# Patient Record
Sex: FEMALE | Race: WHITE | NOT HISPANIC OR LATINO | Employment: FULL TIME | URBAN - METROPOLITAN AREA
[De-identification: names, ages, dates, MRNs, and addresses within clinical notes are randomized per-mention and may not be internally consistent; named-entity substitution may affect disease eponyms.]

---

## 2023-01-09 ENCOUNTER — LAB (OUTPATIENT)
Dept: LAB | Facility: HOSPITAL | Age: 28
End: 2023-01-09
Payer: COMMERCIAL

## 2023-01-09 ENCOUNTER — TRANSCRIBE ORDERS (OUTPATIENT)
Dept: LAB | Facility: HOSPITAL | Age: 28
End: 2023-01-09
Payer: COMMERCIAL

## 2023-01-09 DIAGNOSIS — Z01.419 ROUTINE GYNECOLOGICAL EXAMINATION: ICD-10-CM

## 2023-01-09 DIAGNOSIS — Z01.419 ROUTINE GYNECOLOGICAL EXAMINATION: Primary | ICD-10-CM

## 2023-01-09 LAB
ESTRADIOL SERPL HS-MCNC: 322 PG/ML
FSH SERPL-ACNC: 2.56 MIU/ML
HBA1C MFR BLD: 5.1 % (ref 4.8–5.6)
LH SERPL-ACNC: 10 MIU/ML
PROGEST SERPL-MCNC: 12.5 NG/ML
TESTOST SERPL-MCNC: 56.8 NG/DL (ref 8.4–48.1)

## 2023-01-09 PROCEDURE — 82670 ASSAY OF TOTAL ESTRADIOL: CPT

## 2023-01-09 PROCEDURE — 83036 HEMOGLOBIN GLYCOSYLATED A1C: CPT

## 2023-01-09 PROCEDURE — 84144 ASSAY OF PROGESTERONE: CPT

## 2023-01-09 PROCEDURE — 83002 ASSAY OF GONADOTROPIN (LH): CPT

## 2023-01-09 PROCEDURE — 82627 DEHYDROEPIANDROSTERONE: CPT

## 2023-01-09 PROCEDURE — 36415 COLL VENOUS BLD VENIPUNCTURE: CPT

## 2023-01-09 PROCEDURE — 84403 ASSAY OF TOTAL TESTOSTERONE: CPT

## 2023-01-09 PROCEDURE — 83001 ASSAY OF GONADOTROPIN (FSH): CPT

## 2023-01-10 LAB — DHEA-S SERPL-MCNC: 291 UG/DL (ref 84.8–378)

## 2024-01-12 ENCOUNTER — TRANSCRIBE ORDERS (OUTPATIENT)
Dept: LAB | Facility: HOSPITAL | Age: 29
End: 2024-01-12
Payer: COMMERCIAL

## 2024-01-12 ENCOUNTER — LAB (OUTPATIENT)
Dept: LAB | Facility: HOSPITAL | Age: 29
End: 2024-01-12
Payer: COMMERCIAL

## 2024-01-12 DIAGNOSIS — Z01.419 ROUTINE GYNECOLOGICAL EXAMINATION: Primary | ICD-10-CM

## 2024-01-12 DIAGNOSIS — Z01.419 ROUTINE GYNECOLOGICAL EXAMINATION: ICD-10-CM

## 2024-01-12 LAB
ALBUMIN SERPL-MCNC: 4.4 G/DL (ref 3.5–5.2)
ALBUMIN/GLOB SERPL: 1.5 G/DL
ALP SERPL-CCNC: 54 U/L (ref 39–117)
ALT SERPL W P-5'-P-CCNC: 12 U/L (ref 1–33)
ANION GAP SERPL CALCULATED.3IONS-SCNC: 9.2 MMOL/L (ref 5–15)
AST SERPL-CCNC: 18 U/L (ref 1–32)
BILIRUB SERPL-MCNC: 0.7 MG/DL (ref 0–1.2)
BUN SERPL-MCNC: 14 MG/DL (ref 6–20)
BUN/CREAT SERPL: 17.1 (ref 7–25)
CALCIUM SPEC-SCNC: 9.1 MG/DL (ref 8.6–10.5)
CHLORIDE SERPL-SCNC: 105 MMOL/L (ref 98–107)
CO2 SERPL-SCNC: 24.8 MMOL/L (ref 22–29)
CREAT SERPL-MCNC: 0.82 MG/DL (ref 0.57–1)
DEPRECATED RDW RBC AUTO: 47.4 FL (ref 37–54)
EGFRCR SERPLBLD CKD-EPI 2021: 98.8 ML/MIN/1.73
ERYTHROCYTE [DISTWIDTH] IN BLOOD BY AUTOMATED COUNT: 14.1 % (ref 12.3–15.4)
ESTRADIOL SERPL HS-MCNC: 32.8 PG/ML
FSH SERPL-ACNC: 7.59 MIU/ML
GLOBULIN UR ELPH-MCNC: 2.9 GM/DL
GLUCOSE SERPL-MCNC: 82 MG/DL (ref 65–99)
HBA1C MFR BLD: 5.3 % (ref 4.8–5.6)
HCT VFR BLD AUTO: 36.3 % (ref 34–46.6)
HGB BLD-MCNC: 12.2 G/DL (ref 12–15.9)
LH SERPL-ACNC: 22.4 MIU/ML
MCH RBC QN AUTO: 31.1 PG (ref 26.6–33)
MCHC RBC AUTO-ENTMCNC: 33.6 G/DL (ref 31.5–35.7)
MCV RBC AUTO: 92.6 FL (ref 79–97)
PLATELET # BLD AUTO: 113 10*3/MM3 (ref 140–450)
POTASSIUM SERPL-SCNC: 4.1 MMOL/L (ref 3.5–5.2)
PROGEST SERPL-MCNC: 0.36 NG/ML
PROT SERPL-MCNC: 7.3 G/DL (ref 6–8.5)
RBC # BLD AUTO: 3.92 10*6/MM3 (ref 3.77–5.28)
SODIUM SERPL-SCNC: 139 MMOL/L (ref 136–145)
TSH SERPL DL<=0.05 MIU/L-ACNC: 0.98 UIU/ML (ref 0.27–4.2)
WBC NRBC COR # BLD AUTO: 2.67 10*3/MM3 (ref 3.4–10.8)

## 2024-01-12 PROCEDURE — 80053 COMPREHEN METABOLIC PANEL: CPT | Performed by: OBSTETRICS & GYNECOLOGY

## 2024-01-12 PROCEDURE — 83036 HEMOGLOBIN GLYCOSYLATED A1C: CPT | Performed by: OBSTETRICS & GYNECOLOGY

## 2024-01-12 PROCEDURE — 85027 COMPLETE CBC AUTOMATED: CPT | Performed by: OBSTETRICS & GYNECOLOGY

## 2024-01-12 PROCEDURE — 84144 ASSAY OF PROGESTERONE: CPT | Performed by: OBSTETRICS & GYNECOLOGY

## 2024-01-12 PROCEDURE — 36415 COLL VENOUS BLD VENIPUNCTURE: CPT

## 2024-01-12 PROCEDURE — 84402 ASSAY OF FREE TESTOSTERONE: CPT

## 2024-01-12 PROCEDURE — 84443 ASSAY THYROID STIM HORMONE: CPT

## 2024-01-12 PROCEDURE — 83001 ASSAY OF GONADOTROPIN (FSH): CPT

## 2024-01-12 PROCEDURE — 83002 ASSAY OF GONADOTROPIN (LH): CPT

## 2024-01-12 PROCEDURE — 82670 ASSAY OF TOTAL ESTRADIOL: CPT | Performed by: OBSTETRICS & GYNECOLOGY

## 2024-01-12 PROCEDURE — 82627 DEHYDROEPIANDROSTERONE: CPT

## 2024-01-12 PROCEDURE — 84403 ASSAY OF TOTAL TESTOSTERONE: CPT

## 2024-01-13 LAB — DHEA-S SERPL-MCNC: 372 UG/DL (ref 84.8–378)

## 2024-01-22 LAB
TESTOST FREE SERPL-MCNC: 1.1 PG/ML (ref 0–4.2)
TESTOST SERPL-MCNC: 41 NG/DL (ref 13–71)

## 2024-02-15 ENCOUNTER — OFFICE VISIT (OUTPATIENT)
Dept: INTERNAL MEDICINE | Facility: CLINIC | Age: 29
End: 2024-02-15
Payer: COMMERCIAL

## 2024-02-15 VITALS
OXYGEN SATURATION: 100 % | HEART RATE: 70 BPM | TEMPERATURE: 97.7 F | BODY MASS INDEX: 22.34 KG/M2 | RESPIRATION RATE: 16 BRPM | HEIGHT: 68 IN | WEIGHT: 147.4 LBS | SYSTOLIC BLOOD PRESSURE: 122 MMHG | DIASTOLIC BLOOD PRESSURE: 70 MMHG

## 2024-02-15 DIAGNOSIS — Z13.220 LIPID SCREENING: ICD-10-CM

## 2024-02-15 DIAGNOSIS — D72.819 LEUKOPENIA, UNSPECIFIED TYPE: ICD-10-CM

## 2024-02-15 DIAGNOSIS — Z13.0 SCREENING FOR BLOOD DISEASE: ICD-10-CM

## 2024-02-15 DIAGNOSIS — R53.82 CHRONIC FATIGUE: Primary | ICD-10-CM

## 2024-02-15 DIAGNOSIS — Z13.29 THYROID DISORDER SCREENING: ICD-10-CM

## 2024-02-15 DIAGNOSIS — R52 BODY ACHES: ICD-10-CM

## 2024-02-15 DIAGNOSIS — E28.2 PCOS (POLYCYSTIC OVARIAN SYNDROME): ICD-10-CM

## 2024-02-15 DIAGNOSIS — R11.0 NAUSEA: ICD-10-CM

## 2024-02-15 DIAGNOSIS — E55.9 VITAMIN D DEFICIENCY: ICD-10-CM

## 2024-02-15 DIAGNOSIS — Z13.1 SCREENING FOR DIABETES MELLITUS: ICD-10-CM

## 2024-02-15 DIAGNOSIS — D69.6 THROMBOCYTOPENIA: ICD-10-CM

## 2024-02-15 DIAGNOSIS — Z13.21 ENCOUNTER FOR VITAMIN DEFICIENCY SCREENING: ICD-10-CM

## 2024-02-15 RX ORDER — ERGOCALCIFEROL (VITAMIN D2) 10 MCG
400 TABLET ORAL DAILY
COMMUNITY

## 2024-02-15 RX ORDER — SPIRONOLACTONE 100 MG/1
TABLET, FILM COATED ORAL
COMMUNITY
Start: 2024-01-12

## 2024-02-15 NOTE — PROGRESS NOTES
"Subjective   Chief Complaint   Patient presents with    Fatigue    Establish Care     Had blood work that showed low WBC, been extremely fatigued and hungry the past few months       Donald Bennett is a 29 y.o. female.     The patient is here today to establish care. The patient recently had some blood work done that showed low white blood cell count and low platelets. She reports she is fatigued and hungry often. Low platelets is a chronic issue.     The patient's gynecologist, Dr. Muniz performed her annual exam and ordered her recent labs. Her previous PCP was at [facility] in [facility], but it has been 2 to 3 years since she was seen there. She has a known history of anemia, related to menorrhagia was seeing a hematologist. In 2018 she underwent a blood transfusion. Reports she has always been anemic even as a child. Currently she is not anemic, but her white blood cell counts and platelets were low. Going back to 2020 her white blood cell count had never been low, although her platelets have been low for a while. The patient admits to easy bruising and has several bruises. The hematologist was aware of her low platelets, but at the time they were not concerned about it. Now she has chronic fatigue, vertigo, and nausea which makes it difficult to function on a dally basis.     She was diagnosed with PCOS and has always experienced abnormal menstrual cycles. Currently she describes her menstrual cycles as \"medium\" flow, not as heavy. In 10/2023, she went 93 days without a menstrual cycle, and her cycles are becoming shorter and shorter in length. She walks for exercise and is attempting to lose weight to help keep her PCOS in control. She denies ovarian cysts. The patient is not satisfied with her PCOS treatment. Currently taking spironolactone. Has tried metformin in the past.     Related to her fatigue, her body feels heavy, tired, empty, with lethargy. She eats constantly and is doing protein shakes, " "is well hydrated, and is sleeping better. Previously her work schedule caused her sleep to be off, but this has improved. She admits to bruxism and joint pain, especially in her upper body.     She denies ever being infected with COVID-19 or influenza, but when she has viral rhinitis the rhinorrhea and sore throat tend to linger. She does not get sick often.     The patient has a history of seborrheic dermatitis that flares after consumption of large amounts of sugar or carbohydrates. This started in 2022. She still has some on her hands, but previously it was all over.     She admits to GI upset and constipation stating she is lactose intolerant, although her symptoms flare even without ingesting lactose.     I have reviewed the following portions of the patient's history and confirmed they are accurate: allergies, current medications, past family history, past medical history, past social history, past surgical history, and problem list    Family history is positive for hypertension in her mother.         Review of Systems  Pertinent items are noted in HPI.     Current Outpatient Medications on File Prior to Visit   Medication Sig    spironolactone (ALDACTONE) 100 MG tablet     Vitamin D, Cholecalciferol, (CHOLECALCIFEROL) 10 MCG (400 UNIT) tablet Take 1 tablet by mouth Daily.     No current facility-administered medications on file prior to visit.       Objective   Vitals:    02/15/24 0836   BP: 122/70   BP Location: Right arm   Patient Position: Sitting   Cuff Size: Adult   Pulse: 70   Resp: 16   Temp: 97.7 °F (36.5 °C)   TempSrc: Temporal   SpO2: 100%   Weight: 66.9 kg (147 lb 6.4 oz)   Height: 172 cm (67.72\")   PainSc: 0-No pain     Body mass index is 22.6 kg/m².    Physical Exam  Vitals reviewed.   Constitutional:       Appearance: Normal appearance. She is well-developed.   HENT:      Head: Normocephalic and atraumatic.      Nose: Nose normal.   Eyes:      General: Lids are normal.      Conjunctiva/sclera: " Conjunctivae normal.      Pupils: Pupils are equal, round, and reactive to light.   Neck:      Thyroid: No thyromegaly.      Trachea: Trachea normal.   Cardiovascular:      Rate and Rhythm: Normal rate and regular rhythm.      Heart sounds: Normal heart sounds.   Pulmonary:      Effort: Pulmonary effort is normal. No respiratory distress.      Breath sounds: Normal breath sounds.   Skin:     General: Skin is warm and dry.   Neurological:      Mental Status: She is alert and oriented to person, place, and time.      GCS: GCS eye subscore is 4. GCS verbal subscore is 5. GCS motor subscore is 6.   Psychiatric:         Attention and Perception: Attention normal.         Mood and Affect: Mood normal.         Speech: Speech normal.         Behavior: Behavior normal. Behavior is cooperative.         Thought Content: Thought content normal.     White blood cells and platelets are low.     Assessment & Plan   Problem List Items Addressed This Visit    None  Visit Diagnoses       Chronic fatigue    -  Primary    Relevant Orders    C-reactive Protein    Sedimentation Rate    Rheumatoid Factor    LESTER With / DsDNA, RNP, Sjogrens A / B, Smith    Uric Acid    HLA-B27 Antigen    T4, Free    T3, Free    TSH    Thyroid Antibodies    Leukopenia, unspecified type        Relevant Orders    CBC Auto Differential    Peripheral Blood Smear    Thrombocytopenia        Relevant Orders    US Abdomen Limited    Peripheral Blood Smear    PCOS (polycystic ovarian syndrome)        Relevant Orders    Insulin, Total    Body aches        Relevant Orders    C-reactive Protein    Sedimentation Rate    Rheumatoid Factor    LESTER With / DsDNA, RNP, Sjogrens A / B, Smith    Uric Acid    HLA-B27 Antigen    Vitamin D deficiency        Relevant Orders    Vitamin D,25-Hydroxy    Screening for blood disease        Relevant Orders    Comprehensive Metabolic Panel    Lipid Panel    Hemoglobin A1c    Vitamin B12 & Folate    Vitamin D,25-Hydroxy    Hepatitis C  Antibody    TSH    CBC Auto Differential    Thyroid disorder screening        Relevant Orders    T4, Free    T3, Free    TSH    Thyroid Antibodies    Lipid screening        Relevant Orders    Lipid Panel    Encounter for vitamin deficiency screening        Relevant Orders    Vitamin B12 & Folate    Vitamin D,25-Hydroxy    Screening for diabetes mellitus        Relevant Orders    Hemoglobin A1c    Nausea        Relevant Orders    Celiac Disease Panel          1. Chronic fatigue  - Chronic, unstable.   - Completing labs including CBC with differential, CMP, yearly screening labs, autoimmune panel, and thyroid panel.   - Patient will eat well-balanced meals, drink adequate amounts of fluids, get adequate amount of sleep.   - Follow up in 1 month.    2. Leukopenia   - New, unstable.   - Checking CBC with differential, CMP, and autoimmune panel.   - Consider referral to hematology.    3. Thrombocytopenia  - Chronic, unstable.   - Completing CBC with differential, CMP, autoimmune panel.  - Ordering abdominal ultrasound to further evaluate liver and spleen.    4. PCOS  - Chronic, unstable.   - Continue spironolactone.   - Completing yearly screening labs including A1c, fasting lipid panel, and insulin level.   - Continue follow up with gynecology.          Current Outpatient Medications:     spironolactone (ALDACTONE) 100 MG tablet, , Disp: , Rfl:     Vitamin D, Cholecalciferol, (CHOLECALCIFEROL) 10 MCG (400 UNIT) tablet, Take 1 tablet by mouth Daily., Disp: , Rfl:        Plan of care reviewed with the patient at the conclusion of today's visit.  Education was provided regarding diagnosis, management, and any prescribed or recommended OTC medications.  Patient verbalized understanding of and agreement with management plan.     Return in about 1 month (around 3/15/2024), or if symptoms worsen or fail to improve, for Follow-up.      Transcribed from ambient dictation for JEANNE Robles by Megan Ponce  JEANNE Burger.  02/15/24   08:58 EST  Scribed for JEANNE Robles by Alice Davalos 2/16/2024  08:08 EST    Patient or patient representative verbalized consent to the visit recording.  I have personally performed the services described in this document as transcribed by the above individual, and it is both accurate and complete.

## 2024-02-16 ENCOUNTER — LAB (OUTPATIENT)
Dept: INTERNAL MEDICINE | Facility: CLINIC | Age: 29
End: 2024-02-16
Payer: COMMERCIAL

## 2024-02-16 DIAGNOSIS — D69.6 THROMBOCYTOPENIA: ICD-10-CM

## 2024-02-16 DIAGNOSIS — E28.2 PCOS (POLYCYSTIC OVARIAN SYNDROME): ICD-10-CM

## 2024-02-16 DIAGNOSIS — E55.9 VITAMIN D DEFICIENCY: ICD-10-CM

## 2024-02-16 DIAGNOSIS — Z13.29 THYROID DISORDER SCREENING: ICD-10-CM

## 2024-02-16 DIAGNOSIS — Z13.1 SCREENING FOR DIABETES MELLITUS: ICD-10-CM

## 2024-02-16 DIAGNOSIS — D72.819 LEUKOPENIA, UNSPECIFIED TYPE: ICD-10-CM

## 2024-02-16 DIAGNOSIS — R11.0 NAUSEA: ICD-10-CM

## 2024-02-16 DIAGNOSIS — Z13.220 LIPID SCREENING: ICD-10-CM

## 2024-02-16 DIAGNOSIS — R53.82 CHRONIC FATIGUE: ICD-10-CM

## 2024-02-16 DIAGNOSIS — Z13.0 SCREENING FOR BLOOD DISEASE: ICD-10-CM

## 2024-02-16 DIAGNOSIS — R52 BODY ACHES: ICD-10-CM

## 2024-02-16 DIAGNOSIS — Z13.21 ENCOUNTER FOR VITAMIN DEFICIENCY SCREENING: ICD-10-CM

## 2024-02-16 LAB
25(OH)D3 SERPL-MCNC: 30.1 NG/ML (ref 30–100)
ALBUMIN SERPL-MCNC: 4.3 G/DL (ref 3.5–5.2)
ALBUMIN/GLOB SERPL: 1.6 G/DL
ALP SERPL-CCNC: 44 U/L (ref 39–117)
ALT SERPL W P-5'-P-CCNC: 15 U/L (ref 1–33)
ANION GAP SERPL CALCULATED.3IONS-SCNC: 8 MMOL/L (ref 5–15)
AST SERPL-CCNC: 18 U/L (ref 1–32)
BILIRUB SERPL-MCNC: 0.8 MG/DL (ref 0–1.2)
BUN SERPL-MCNC: 10 MG/DL (ref 6–20)
BUN/CREAT SERPL: 11.5 (ref 7–25)
CALCIUM SPEC-SCNC: 9.2 MG/DL (ref 8.6–10.5)
CHLORIDE SERPL-SCNC: 107 MMOL/L (ref 98–107)
CHOLEST SERPL-MCNC: 167 MG/DL (ref 0–200)
CHROMATIN AB SERPL-ACNC: 10 IU/ML (ref 0–14)
CO2 SERPL-SCNC: 24 MMOL/L (ref 22–29)
CREAT SERPL-MCNC: 0.87 MG/DL (ref 0.57–1)
CRP SERPL-MCNC: <0.3 MG/DL (ref 0–0.5)
DEPRECATED RDW RBC AUTO: 50.8 FL (ref 37–54)
EGFRCR SERPLBLD CKD-EPI 2021: 92.1 ML/MIN/1.73
EOSINOPHIL # BLD MANUAL: 0.09 10*3/MM3 (ref 0–0.4)
EOSINOPHIL NFR BLD MANUAL: 4 % (ref 0.3–6.2)
ERYTHROCYTE [DISTWIDTH] IN BLOOD BY AUTOMATED COUNT: 14.5 % (ref 12.3–15.4)
ERYTHROCYTE [SEDIMENTATION RATE] IN BLOOD: 9 MM/HR (ref 0–20)
FOLATE SERPL-MCNC: 11.9 NG/ML (ref 4.78–24.2)
GLOBULIN UR ELPH-MCNC: 2.7 GM/DL
GLUCOSE SERPL-MCNC: 84 MG/DL (ref 65–99)
HBA1C MFR BLD: 5.4 % (ref 4.8–5.6)
HCT VFR BLD AUTO: 35.7 % (ref 34–46.6)
HCV AB SER DONR QL: NORMAL
HDLC SERPL-MCNC: 67 MG/DL (ref 40–60)
HGB BLD-MCNC: 11.8 G/DL (ref 12–15.9)
LAB AP CASE REPORT: NORMAL
LDLC SERPL CALC-MCNC: 92 MG/DL (ref 0–100)
LDLC/HDLC SERPL: 1.39 {RATIO}
LYMPHOCYTES # BLD MANUAL: 0.65 10*3/MM3 (ref 0.7–3.1)
LYMPHOCYTES NFR BLD MANUAL: 10.1 % (ref 5–12)
MCH RBC QN AUTO: 31.2 PG (ref 26.6–33)
MCHC RBC AUTO-ENTMCNC: 33.1 G/DL (ref 31.5–35.7)
MCV RBC AUTO: 94.4 FL (ref 79–97)
MONOCYTES # BLD: 0.22 10*3/MM3 (ref 0.1–0.9)
NEUTROPHILS # BLD AUTO: 1.2 10*3/MM3 (ref 1.7–7)
NEUTROPHILS NFR BLD MANUAL: 55.6 % (ref 42.7–76)
PATH REPORT.FINAL DX SPEC: NORMAL
PATHOLOGY REVIEW: YES
PLAT MORPH BLD: NORMAL
PLATELET # BLD AUTO: 103 10*3/MM3 (ref 140–450)
POTASSIUM SERPL-SCNC: 3.7 MMOL/L (ref 3.5–5.2)
PROT SERPL-MCNC: 7 G/DL (ref 6–8.5)
RBC # BLD AUTO: 3.78 10*6/MM3 (ref 3.77–5.28)
RBC MORPH BLD: NORMAL
SODIUM SERPL-SCNC: 139 MMOL/L (ref 136–145)
T3FREE SERPL-MCNC: 2.45 PG/ML (ref 2–4.4)
T4 FREE SERPL-MCNC: 1.13 NG/DL (ref 0.93–1.7)
TRIGL SERPL-MCNC: 35 MG/DL (ref 0–150)
TSH SERPL DL<=0.05 MIU/L-ACNC: 1.41 UIU/ML (ref 0.27–4.2)
URATE SERPL-MCNC: 3.4 MG/DL (ref 2.4–5.7)
VARIANT LYMPHS NFR BLD MANUAL: 30.3 % (ref 19.6–45.3)
VIT B12 BLD-MCNC: 493 PG/ML (ref 211–946)
VLDLC SERPL-MCNC: 8 MG/DL (ref 5–40)
WBC MORPH BLD: NORMAL
WBC NRBC COR # BLD AUTO: 2.15 10*3/MM3 (ref 3.4–10.8)

## 2024-02-16 PROCEDURE — 81374 HLA I TYPING 1 ANTIGEN LR: CPT | Performed by: NURSE PRACTITIONER

## 2024-02-16 PROCEDURE — 86364 TISS TRNSGLTMNASE EA IG CLAS: CPT | Performed by: NURSE PRACTITIONER

## 2024-02-16 PROCEDURE — 83525 ASSAY OF INSULIN: CPT | Performed by: NURSE PRACTITIONER

## 2024-02-16 PROCEDURE — 82784 ASSAY IGA/IGD/IGG/IGM EACH: CPT | Performed by: NURSE PRACTITIONER

## 2024-02-16 PROCEDURE — 84439 ASSAY OF FREE THYROXINE: CPT | Performed by: NURSE PRACTITIONER

## 2024-02-16 PROCEDURE — 86376 MICROSOMAL ANTIBODY EACH: CPT | Performed by: NURSE PRACTITIONER

## 2024-02-16 PROCEDURE — 80061 LIPID PANEL: CPT | Performed by: NURSE PRACTITIONER

## 2024-02-16 PROCEDURE — 86800 THYROGLOBULIN ANTIBODY: CPT | Performed by: NURSE PRACTITIONER

## 2024-02-16 PROCEDURE — 86803 HEPATITIS C AB TEST: CPT | Performed by: NURSE PRACTITIONER

## 2024-02-16 PROCEDURE — 82306 VITAMIN D 25 HYDROXY: CPT | Performed by: NURSE PRACTITIONER

## 2024-02-16 PROCEDURE — 82746 ASSAY OF FOLIC ACID SERUM: CPT | Performed by: NURSE PRACTITIONER

## 2024-02-16 PROCEDURE — 85025 COMPLETE CBC W/AUTO DIFF WBC: CPT | Performed by: NURSE PRACTITIONER

## 2024-02-16 PROCEDURE — 84550 ASSAY OF BLOOD/URIC ACID: CPT | Performed by: NURSE PRACTITIONER

## 2024-02-16 PROCEDURE — 82607 VITAMIN B-12: CPT | Performed by: NURSE PRACTITIONER

## 2024-02-16 PROCEDURE — 84481 FREE ASSAY (FT-3): CPT | Performed by: NURSE PRACTITIONER

## 2024-02-16 PROCEDURE — 85652 RBC SED RATE AUTOMATED: CPT | Performed by: NURSE PRACTITIONER

## 2024-02-16 PROCEDURE — 86038 ANTINUCLEAR ANTIBODIES: CPT | Performed by: NURSE PRACTITIONER

## 2024-02-16 PROCEDURE — 86231 EMA EACH IG CLASS: CPT | Performed by: NURSE PRACTITIONER

## 2024-02-16 PROCEDURE — 83036 HEMOGLOBIN GLYCOSYLATED A1C: CPT | Performed by: NURSE PRACTITIONER

## 2024-02-16 PROCEDURE — 86431 RHEUMATOID FACTOR QUANT: CPT | Performed by: NURSE PRACTITIONER

## 2024-02-16 PROCEDURE — 36415 COLL VENOUS BLD VENIPUNCTURE: CPT | Performed by: NURSE PRACTITIONER

## 2024-02-16 PROCEDURE — 86140 C-REACTIVE PROTEIN: CPT | Performed by: NURSE PRACTITIONER

## 2024-02-16 PROCEDURE — 85007 BL SMEAR W/DIFF WBC COUNT: CPT | Performed by: NURSE PRACTITIONER

## 2024-02-16 PROCEDURE — 80053 COMPREHEN METABOLIC PANEL: CPT | Performed by: NURSE PRACTITIONER

## 2024-02-16 PROCEDURE — 84443 ASSAY THYROID STIM HORMONE: CPT | Performed by: NURSE PRACTITIONER

## 2024-02-18 LAB — INSULIN SERPL-ACNC: 4.5 UIU/ML (ref 2.6–24.9)

## 2024-02-19 ENCOUNTER — PATIENT ROUNDING (BHMG ONLY) (OUTPATIENT)
Dept: INTERNAL MEDICINE | Facility: CLINIC | Age: 29
End: 2024-02-19
Payer: COMMERCIAL

## 2024-02-19 LAB
ANA SER QL: NEGATIVE
ENDOMYSIUM IGA SER QL: NEGATIVE
IGA SERPL-MCNC: 190 MG/DL (ref 87–352)
THYROGLOB AB SERPL-ACNC: <1 IU/ML (ref 0–0.9)
THYROPEROXIDASE AB SERPL-ACNC: <9 IU/ML (ref 0–34)
TTG IGA SER-ACNC: <2 U/ML (ref 0–3)

## 2024-02-19 NOTE — PROGRESS NOTES
A  my chart message has been sent to the patient for patient [place] [facility]

## 2024-02-23 LAB — HLA-B27 QL NAA+PROBE: NEGATIVE

## 2024-03-08 ENCOUNTER — HOSPITAL ENCOUNTER (OUTPATIENT)
Dept: ULTRASOUND IMAGING | Facility: HOSPITAL | Age: 29
Discharge: HOME | End: 2024-03-08
Payer: COMMERCIAL

## 2024-03-08 DIAGNOSIS — D69.6 THROMBOCYTOPENIA: ICD-10-CM

## 2024-03-08 PROCEDURE — 76705 ECHO EXAM OF ABDOMEN: CPT

## 2024-03-15 ENCOUNTER — OFFICE VISIT (OUTPATIENT)
Dept: INTERNAL MEDICINE | Facility: CLINIC | Age: 29
End: 2024-03-15
Payer: COMMERCIAL

## 2024-03-15 VITALS
HEIGHT: 68 IN | TEMPERATURE: 97.3 F | SYSTOLIC BLOOD PRESSURE: 125 MMHG | WEIGHT: 154.4 LBS | HEART RATE: 63 BPM | OXYGEN SATURATION: 100 % | BODY MASS INDEX: 23.4 KG/M2 | DIASTOLIC BLOOD PRESSURE: 75 MMHG

## 2024-03-15 DIAGNOSIS — R53.82 CHRONIC FATIGUE: ICD-10-CM

## 2024-03-15 DIAGNOSIS — R51.9 PERSISTENT HEADACHES: ICD-10-CM

## 2024-03-15 DIAGNOSIS — D50.9 IRON DEFICIENCY ANEMIA, UNSPECIFIED IRON DEFICIENCY ANEMIA TYPE: Primary | ICD-10-CM

## 2024-03-15 DIAGNOSIS — D72.819 LEUKOPENIA, UNSPECIFIED TYPE: ICD-10-CM

## 2024-03-15 DIAGNOSIS — D69.6 THROMBOCYTOPENIA: ICD-10-CM

## 2024-03-15 PROCEDURE — 99214 OFFICE O/P EST MOD 30 MIN: CPT | Performed by: NURSE PRACTITIONER

## 2024-03-15 PROCEDURE — 1160F RVW MEDS BY RX/DR IN RCRD: CPT | Performed by: NURSE PRACTITIONER

## 2024-03-15 PROCEDURE — 1159F MED LIST DOCD IN RCRD: CPT | Performed by: NURSE PRACTITIONER

## 2024-03-15 RX ORDER — FERROUS SULFATE 325(65) MG
325 TABLET ORAL
Qty: 30 TABLET | Refills: 2 | Status: SHIPPED | OUTPATIENT
Start: 2024-03-15

## 2024-03-15 RX ORDER — DIPHENOXYLATE HYDROCHLORIDE AND ATROPINE SULFATE 2.5; .025 MG/1; MG/1
1 TABLET ORAL DAILY
Qty: 30 TABLET | Refills: 5 | Status: SHIPPED | OUTPATIENT
Start: 2024-03-15

## 2024-03-15 NOTE — PROGRESS NOTES
Subjective   Chief Complaint   Patient presents with    Follow-up     Patient is here for a 1 month follow up for bloodwork and to go over Ultrasound results.       Donald Bennett is a 29 y.o. female.     The patient is here today for follow-up.    The patient states that her fatigue and hunger have calmed down. She states that she is not stressed and has been taking more walks and eating differently. She eats red meat and denies taking a multivitamin.    The patient complained that she is suffering from an intense headache for the past 2 weeks. She states that she took Advil and she is sleeping. She thinks she might be dehydrated. Her veins are always enlarged. She is trying to drink more water. She is not sure if it is due to electrolyte imbalance. she states that the headache is usually on one side of her head. She denies nasal congestion or drainage or lot of stress. She was fine this week, but it came back yesterday. She took Advil today.    The patient informed that her bone pain became worse in her shoulder and back. Her nausea is better. She gets scratches all over her body has blue, purple, and green spots. She is taking zinc and vitamin D. She does not like mushrooms.    The patient complained about her menstrual cycle. She had a couple of spotting last week. The week before that, it came and did not come. She had a gynecology appointment with Dr. Muniz on 02/12/2024. She has tried oral contraceptives, but she did not like how she felt.   .  Headaches on right side.     I have reviewed the following portions of the patient's history and confirmed they are accurate: allergies, current medications, past family history, past medical history, past social history, past surgical history, and problem list    Review of Systems  Pertinent items are noted in HPI.     Current Outpatient Medications on File Prior to Visit   Medication Sig    spironolactone (ALDACTONE) 100 MG tablet     Vitamin D, Cholecalciferol,  "(CHOLECALCIFEROL) 10 MCG (400 UNIT) tablet Take 1 tablet by mouth Daily.     No current facility-administered medications on file prior to visit.       Objective   Vitals:    03/15/24 0904   BP: 125/75   Pulse: 63   Temp: 97.3 °F (36.3 °C)   TempSrc: Temporal   SpO2: 100%   Weight: 70 kg (154 lb 6.4 oz)   Height: 172 cm (67.72\")     Body mass index is 23.67 kg/m².    Physical Exam  Vitals reviewed.   Constitutional:       Appearance: Normal appearance. She is well-developed.   HENT:      Head: Normocephalic and atraumatic.      Nose: Nose normal.   Eyes:      General: Lids are normal.      Conjunctiva/sclera: Conjunctivae normal.      Pupils: Pupils are equal, round, and reactive to light.   Neck:      Thyroid: No thyromegaly.      Trachea: Trachea normal.   Cardiovascular:      Rate and Rhythm: Normal rate and regular rhythm.      Heart sounds: Normal heart sounds.   Pulmonary:      Effort: Pulmonary effort is normal. No respiratory distress.      Breath sounds: Normal breath sounds.   Skin:     General: Skin is warm and dry.   Neurological:      Mental Status: She is alert and oriented to person, place, and time.      GCS: GCS eye subscore is 4. GCS verbal subscore is 5. GCS motor subscore is 6.   Psychiatric:         Attention and Perception: Attention normal.         Mood and Affect: Mood normal.         Speech: Speech normal.         Behavior: Behavior normal. Behavior is cooperative.         Thought Content: Thought content normal.         Labs were reviewed with the patient.    Assessment & Plan   Problem List Items Addressed This Visit    None  Visit Diagnoses       Iron deficiency anemia, unspecified iron deficiency anemia type    -  Primary    Relevant Medications    ferrous sulfate 325 (65 FE) MG tablet    multivitamin (Multiple Vitamin) tablet tablet    Chronic fatigue        Relevant Orders    Ambulatory Referral to Hematology (Completed)    Leukopenia, unspecified type        Relevant Medications    " ferrous sulfate 325 (65 FE) MG tablet    Other Relevant Orders    Ambulatory Referral to Hematology (Completed)    Thrombocytopenia        Relevant Medications    ferrous sulfate 325 (65 FE) MG tablet    Other Relevant Orders    Ambulatory Referral to Hematology (Completed)    Persistent headaches            1. Iron deficiency anemia.  This is new, stable.   Advised her to start iron supplement.   I discussed with the patient that her anemia is mild.   She can take iron supplement every other day, to take this with a meal and vitamin C to help with absorption.   Advised her to start multivitamin daily.   She will follow a high-iron diet.    2. Chronic fatigue.  This is chronic, stable.   Advised her to start iron supplement to help with anemia.   Referred the patient to hematology due to low white blood cell count and low platelets.  Advised to start multivitamin.     3. Leukopenia and thrombocytopenia:  This is chronic, unstable.   I am referring the patient to hematology for consult.    4. Persistent headaches.  This is new, stable.   The patient will increase water intake and become well hydrated.   She will work on relaxation exercises and getting adequate amount of sleep and eating well-balanced meals.   If there is no improvement in symptoms, she will follow up and consider possible migraine headaches.         Current Outpatient Medications:     ferrous sulfate 325 (65 FE) MG tablet, Take 1 tablet by mouth Daily With Breakfast. Take with orange juice or vitamin C, Disp: 30 tablet, Rfl: 2    multivitamin (Multiple Vitamin) tablet tablet, Take 1 tablet by mouth Daily., Disp: 30 tablet, Rfl: 5    spironolactone (ALDACTONE) 100 MG tablet, , Disp: , Rfl:     Vitamin D, Cholecalciferol, (CHOLECALCIFEROL) 10 MCG (400 UNIT) tablet, Take 1 tablet by mouth Daily., Disp: , Rfl:        Plan of care reviewed with the patient at the conclusion of today's visit.  Education was provided regarding diagnosis, management, and  any prescribed or recommended OTC medications.  Patient verbalized understanding of and agreement with management plan.     Return in about 3 months (around 6/15/2024), or if symptoms worsen or fail to improve, for Follow-up.      Transcribed from ambient dictation for JEANNE Robles by Antony Esqueda 03/15/24 09:44 EDT    Patient or patient representative verbalized consent to the visit recording.  I have personally performed the services described in this document as transcribed by the above individual, and it is both accurate and complete.

## 2024-03-19 ENCOUNTER — TELEPHONE (OUTPATIENT)
Dept: INTERNAL MEDICINE | Facility: CLINIC | Age: 29
End: 2024-03-19
Payer: COMMERCIAL

## 2024-03-19 NOTE — TELEPHONE ENCOUNTER
" ONCOLOGY STATED \" ROUTING BACK TO AMBULATORY - PLEASE HAVE PROVIDER SIGN OFF ON NOTE, AND ROUTE REFERRAL BACK, THANK YOU \" PLEASE ADVISE  "

## 2024-05-10 ENCOUNTER — LAB (OUTPATIENT)
Dept: LAB | Facility: HOSPITAL | Age: 29
End: 2024-05-10
Payer: COMMERCIAL

## 2024-05-10 ENCOUNTER — CONSULT (OUTPATIENT)
Dept: ONCOLOGY | Facility: CLINIC | Age: 29
End: 2024-05-10
Payer: COMMERCIAL

## 2024-05-10 VITALS
WEIGHT: 149 LBS | TEMPERATURE: 97.3 F | HEIGHT: 67 IN | RESPIRATION RATE: 16 BRPM | SYSTOLIC BLOOD PRESSURE: 92 MMHG | DIASTOLIC BLOOD PRESSURE: 65 MMHG | BODY MASS INDEX: 23.39 KG/M2 | OXYGEN SATURATION: 98 % | HEART RATE: 50 BPM

## 2024-05-10 DIAGNOSIS — R53.82 CHRONIC FATIGUE: ICD-10-CM

## 2024-05-10 DIAGNOSIS — D70.8 OTHER NEUTROPENIA: Primary | ICD-10-CM

## 2024-05-10 DIAGNOSIS — D70.8 OTHER NEUTROPENIA: ICD-10-CM

## 2024-05-10 LAB
BASOPHILS # BLD AUTO: 0.02 10*3/MM3 (ref 0–0.2)
BASOPHILS NFR BLD AUTO: 0.8 % (ref 0–1.5)
CK MB SERPL-CCNC: 1.33 NG/ML
CK SERPL-CCNC: 105 U/L (ref 20–180)
CORTIS AM PEAK SERPL-MCNC: 6.52 MCG/DL
DEPRECATED RDW RBC AUTO: 45.8 FL (ref 37–54)
EOSINOPHIL # BLD AUTO: 0.03 10*3/MM3 (ref 0–0.4)
EOSINOPHIL NFR BLD AUTO: 1.2 % (ref 0.3–6.2)
ERYTHROCYTE [DISTWIDTH] IN BLOOD BY AUTOMATED COUNT: 12.9 % (ref 12.3–15.4)
FERRITIN SERPL-MCNC: 14.2 NG/ML (ref 13–150)
HCT VFR BLD AUTO: 37.5 % (ref 34–46.6)
HGB BLD-MCNC: 12.7 G/DL (ref 12–15.9)
IMM GRANULOCYTES # BLD AUTO: 0 10*3/MM3 (ref 0–0.05)
IMM GRANULOCYTES NFR BLD AUTO: 0 % (ref 0–0.5)
LYMPHOCYTES # BLD AUTO: 1.03 10*3/MM3 (ref 0.7–3.1)
LYMPHOCYTES NFR BLD AUTO: 42 % (ref 19.6–45.3)
MCH RBC QN AUTO: 32.4 PG (ref 26.6–33)
MCHC RBC AUTO-ENTMCNC: 33.9 G/DL (ref 31.5–35.7)
MCV RBC AUTO: 95.7 FL (ref 79–97)
MONOCYTES # BLD AUTO: 0.26 10*3/MM3 (ref 0.1–0.9)
MONOCYTES NFR BLD AUTO: 10.6 % (ref 5–12)
NEUTROPHILS NFR BLD AUTO: 1.11 10*3/MM3 (ref 1.7–7)
NEUTROPHILS NFR BLD AUTO: 45.4 % (ref 42.7–76)
PLATELET # BLD AUTO: 98 10*3/MM3 (ref 140–450)
RBC # BLD AUTO: 3.92 10*6/MM3 (ref 3.77–5.28)
WBC NRBC COR # BLD AUTO: 2.45 10*3/MM3 (ref 3.4–10.8)

## 2024-05-10 PROCEDURE — 1126F AMNT PAIN NOTED NONE PRSNT: CPT | Performed by: INTERNAL MEDICINE

## 2024-05-10 PROCEDURE — 82533 TOTAL CORTISOL: CPT

## 2024-05-10 PROCEDURE — 87799 DETECT AGENT NOS DNA QUANT: CPT

## 2024-05-10 PROCEDURE — 99204 OFFICE O/P NEW MOD 45 MIN: CPT | Performed by: INTERNAL MEDICINE

## 2024-05-10 PROCEDURE — 85025 COMPLETE CBC W/AUTO DIFF WBC: CPT

## 2024-05-10 PROCEDURE — 82550 ASSAY OF CK (CPK): CPT

## 2024-05-10 PROCEDURE — 82553 CREATINE MB FRACTION: CPT

## 2024-05-10 PROCEDURE — 82728 ASSAY OF FERRITIN: CPT

## 2024-05-10 PROCEDURE — 36415 COLL VENOUS BLD VENIPUNCTURE: CPT

## 2024-05-13 LAB — EBV DNA SERPL NAA+PROBE-ACNC: NEGATIVE IU/ML

## 2024-05-17 ENCOUNTER — LAB (OUTPATIENT)
Facility: HOSPITAL | Age: 29
End: 2024-05-17
Payer: COMMERCIAL

## 2024-05-17 DIAGNOSIS — D70.8 OTHER NEUTROPENIA: ICD-10-CM

## 2024-05-17 DIAGNOSIS — R53.82 CHRONIC FATIGUE: ICD-10-CM

## 2024-05-17 LAB
BASOPHILS # BLD AUTO: 0.02 10*3/MM3 (ref 0–0.2)
BASOPHILS NFR BLD AUTO: 0.5 % (ref 0–1.5)
DEPRECATED RDW RBC AUTO: 47.1 FL (ref 37–54)
EOSINOPHIL # BLD AUTO: 0.07 10*3/MM3 (ref 0–0.4)
EOSINOPHIL NFR BLD AUTO: 1.8 % (ref 0.3–6.2)
ERYTHROCYTE [DISTWIDTH] IN BLOOD BY AUTOMATED COUNT: 13.2 % (ref 12.3–15.4)
HCT VFR BLD AUTO: 39.4 % (ref 34–46.6)
HGB BLD-MCNC: 12.9 G/DL (ref 12–15.9)
IMM GRANULOCYTES # BLD AUTO: 0 10*3/MM3 (ref 0–0.05)
IMM GRANULOCYTES NFR BLD AUTO: 0 % (ref 0–0.5)
LYMPHOCYTES # BLD AUTO: 1.44 10*3/MM3 (ref 0.7–3.1)
LYMPHOCYTES NFR BLD AUTO: 37.1 % (ref 19.6–45.3)
MCH RBC QN AUTO: 31.8 PG (ref 26.6–33)
MCHC RBC AUTO-ENTMCNC: 32.7 G/DL (ref 31.5–35.7)
MCV RBC AUTO: 97 FL (ref 79–97)
MONOCYTES # BLD AUTO: 0.45 10*3/MM3 (ref 0.1–0.9)
MONOCYTES NFR BLD AUTO: 11.6 % (ref 5–12)
NEUTROPHILS NFR BLD AUTO: 1.9 10*3/MM3 (ref 1.7–7)
NEUTROPHILS NFR BLD AUTO: 49 % (ref 42.7–76)
NRBC BLD AUTO-RTO: 0 /100 WBC (ref 0–0.2)
PLATELET # BLD AUTO: 124 10*3/MM3 (ref 140–450)
RBC # BLD AUTO: 4.06 10*6/MM3 (ref 3.77–5.28)
WBC NRBC COR # BLD AUTO: 3.88 10*3/MM3 (ref 3.4–10.8)

## 2024-05-17 PROCEDURE — 36415 COLL VENOUS BLD VENIPUNCTURE: CPT

## 2024-05-17 PROCEDURE — 85025 COMPLETE CBC W/AUTO DIFF WBC: CPT

## 2024-05-22 ENCOUNTER — OFFICE VISIT (OUTPATIENT)
Dept: INTERNAL MEDICINE | Facility: CLINIC | Age: 29
End: 2024-05-22
Payer: COMMERCIAL

## 2024-05-22 ENCOUNTER — LAB (OUTPATIENT)
Dept: INTERNAL MEDICINE | Facility: CLINIC | Age: 29
End: 2024-05-22
Payer: COMMERCIAL

## 2024-05-22 VITALS
TEMPERATURE: 97.3 F | OXYGEN SATURATION: 100 % | HEART RATE: 68 BPM | BODY MASS INDEX: 23.7 KG/M2 | SYSTOLIC BLOOD PRESSURE: 112 MMHG | HEIGHT: 67 IN | DIASTOLIC BLOOD PRESSURE: 80 MMHG | WEIGHT: 151 LBS

## 2024-05-22 DIAGNOSIS — Z00.00 PHYSICAL EXAM: Primary | ICD-10-CM

## 2024-05-22 DIAGNOSIS — R14.0 BLOATING: ICD-10-CM

## 2024-05-22 DIAGNOSIS — R55 NEAR SYNCOPE: ICD-10-CM

## 2024-05-22 DIAGNOSIS — R11.0 NAUSEA: ICD-10-CM

## 2024-05-22 DIAGNOSIS — R19.7 DIARRHEA, UNSPECIFIED TYPE: ICD-10-CM

## 2024-05-22 DIAGNOSIS — R42 VERTIGO: ICD-10-CM

## 2024-05-22 DIAGNOSIS — R42 DIZZINESS: Primary | ICD-10-CM

## 2024-05-22 PROCEDURE — 1126F AMNT PAIN NOTED NONE PRSNT: CPT | Performed by: INTERNAL MEDICINE

## 2024-05-22 PROCEDURE — 85652 RBC SED RATE AUTOMATED: CPT | Performed by: INTERNAL MEDICINE

## 2024-05-22 PROCEDURE — 80053 COMPREHEN METABOLIC PANEL: CPT | Performed by: INTERNAL MEDICINE

## 2024-05-22 PROCEDURE — 86140 C-REACTIVE PROTEIN: CPT | Performed by: INTERNAL MEDICINE

## 2024-05-22 PROCEDURE — 99214 OFFICE O/P EST MOD 30 MIN: CPT | Performed by: INTERNAL MEDICINE

## 2024-05-22 PROCEDURE — 83013 H PYLORI (C-13) BREATH: CPT | Performed by: INTERNAL MEDICINE

## 2024-05-22 PROCEDURE — 36415 COLL VENOUS BLD VENIPUNCTURE: CPT | Performed by: INTERNAL MEDICINE

## 2024-05-22 RX ORDER — SIMETHICONE 125 MG
1 CAPSULE ORAL EVERY 6 HOURS PRN
Qty: 60 CAPSULE | Refills: 5 | Status: SHIPPED | OUTPATIENT
Start: 2024-05-22

## 2024-05-22 RX ORDER — ONDANSETRON 4 MG/1
4 TABLET, ORALLY DISINTEGRATING ORAL EVERY 8 HOURS PRN
Qty: 30 TABLET | Refills: 5 | Status: SHIPPED | OUTPATIENT
Start: 2024-05-22

## 2024-05-22 RX ORDER — MECLIZINE HYDROCHLORIDE 25 MG/1
TABLET ORAL
Qty: 60 TABLET | Refills: 1 | Status: SHIPPED | OUTPATIENT
Start: 2024-05-22

## 2024-05-22 NOTE — PROGRESS NOTES
Follow Up Office Visit      Date: 2024   Patient Name: Donald Bennett  : 1995   MRN: [id number]     Chief Complaint:    Chief Complaint   Patient presents with    Diarrhea    Dizziness    Headache    Nausea       History of Present Illness: Donald Bennett is a 29 y.o. female who is here today to follow up with sickness since Thursday.  Has had dizziness, nausea, diarrhea.  Has been having recurrent sx since December.  Has had bloating.  Lightheadedness is the worst symptom. Has pain at right tempole region.       Subjective      Past Medical History:   Diagnosis Date    Anemia     PCOS (polycystic ovarian syndrome)        History reviewed. No pertinent surgical history.    Family History   Problem Relation Age of Onset    Hypertension Mother         Social History     Socioeconomic History    Marital status: Single   Tobacco Use    Smoking status: Never    Smokeless tobacco: Never   Vaping Use    Vaping status: Never Used   Substance and Sexual Activity    Alcohol use: Never    Drug use: Never    Sexual activity: Never         I have reviewed the patients family history, social history, past medical history, past surgical history and have updated it as appropriate.     Medications:     Current Outpatient Medications:     multivitamin (Multiple Vitamin) tablet tablet, Take 1 tablet by mouth Daily., Disp: 30 tablet, Rfl: 5    Nutritional Supplements (NUTRITIONAL SUPPLEMENT PO), Take  by mouth. S'moo for PCOS, Disp: , Rfl:     meclizine (ANTIVERT) 25 MG tablet, Take 1/2-1 tablet by mouth 3 times daily as needed for dizziness., Disp: 60 tablet, Rfl: 1    ondansetron ODT (ZOFRAN-ODT) 4 MG disintegrating tablet, Place 1 tablet on the tongue Every 8 (Eight) Hours As Needed for Nausea or Vomiting., Disp: 30 tablet, Rfl: 5    simethicone (Gas-X Extra Strength) 125 MG capsule capsule, Take 1 capsule by mouth Every 6 (Six) Hours As Needed (bloating)., Disp: 60 capsule, Rfl: 5    spironolactone  "(ALDACTONE) 100 MG tablet, , Disp: , Rfl:     Allergies:   No Known Allergies    Objective       Vital Signs:   Vitals:    05/22/24 1044   BP: 112/80   BP Location: Left arm   Patient Position: Sitting   Cuff Size: Adult   Pulse: 68   Temp: 97.3 °F (36.3 °C)   TempSrc: Tympanic   SpO2: 100%   Weight: 68.5 kg (151 lb)   Height: 170.2 cm (67\")     Body mass index is 23.65 kg/m².    Physical Exam:  Physical Exam  Constitutional:       General: She is not in acute distress.     Appearance: Normal appearance. She is not ill-appearing.   HENT:      Nose: No congestion or rhinorrhea.      Mouth/Throat:      Mouth: Mucous membranes are moist.      Pharynx: No oropharyngeal exudate.   Eyes:      Extraocular Movements: Extraocular movements intact.      Conjunctiva/sclera: Conjunctivae normal.      Pupils: Pupils are equal, round, and reactive to light.   Cardiovascular:      Rate and Rhythm: Normal rate and regular rhythm.   Pulmonary:      Effort: Pulmonary effort is normal. No respiratory distress.      Breath sounds: Normal breath sounds.   Abdominal:      General: Abdomen is flat. Bowel sounds are normal.      Palpations: Abdomen is soft.      Tenderness: There is no abdominal tenderness.   Musculoskeletal:         General: No swelling.      Cervical back: Neck supple.      Right lower leg: No edema.      Left lower leg: No edema.   Skin:     Coloration: Skin is not jaundiced.      Findings: No rash.   Neurological:      Mental Status: She is alert and oriented to person, place, and time.      Cranial Nerves: No cranial nerve deficit.      Gait: Gait abnormal.      Comments: Ataxia noted, nystagmus     Psychiatric:         Mood and Affect: Mood normal.         Behavior: Behavior normal.         Thought Content: Thought content normal.         Procedures    Results:       Assessment / Plan      Assessment/Plan:   Diagnoses and all orders for this visit:    1. Dizziness (Primary)  -     meclizine (ANTIVERT) 25 MG tablet; " Take 1/2-1 tablet by mouth 3 times daily as needed for dizziness.  Dispense: 60 tablet; Refill: 1  -     MRI Brain Without Contrast    2. Diarrhea, unspecified type  -     C-reactive protein  -     Sedimentation Rate  -     Comprehensive metabolic panel    3. Near syncope    4. Bloating  -     Ambulatory Referral to Gastroenterology  -     simethicone (Gas-X Extra Strength) 125 MG capsule capsule; Take 1 capsule by mouth Every 6 (Six) Hours As Needed (bloating).  Dispense: 60 capsule; Refill: 5  -     ondansetron ODT (ZOFRAN-ODT) 4 MG disintegrating tablet; Place 1 tablet on the tongue Every 8 (Eight) Hours As Needed for Nausea or Vomiting.  Dispense: 30 tablet; Refill: 5    5. Nausea  -     Ambulatory Referral to Gastroenterology  -     simethicone (Gas-X Extra Strength) 125 MG capsule capsule; Take 1 capsule by mouth Every 6 (Six) Hours As Needed (bloating).  Dispense: 60 capsule; Refill: 5  -     ondansetron ODT (ZOFRAN-ODT) 4 MG disintegrating tablet; Place 1 tablet on the tongue Every 8 (Eight) Hours As Needed for Nausea or Vomiting.  Dispense: 30 tablet; Refill: 5    6. Vertigo  -     MRI Brain Without Contrast         BMI is within normal parameters. No other follow-up for BMI required.     Advised to get bp cuff and check bp during her episodes for the dizziness.   Follow Up:   No follow-ups on file.    Gerson Lombardi DO    [facility] MILLY CARRILLO

## 2024-05-23 LAB
ALBUMIN SERPL-MCNC: 3.7 G/DL (ref 3.5–5.2)
ALBUMIN/GLOB SERPL: 1.3 G/DL
ALP SERPL-CCNC: 40 U/L (ref 39–117)
ALT SERPL W P-5'-P-CCNC: 15 U/L (ref 1–33)
ANION GAP SERPL CALCULATED.3IONS-SCNC: 9.4 MMOL/L (ref 5–15)
AST SERPL-CCNC: 14 U/L (ref 1–32)
BILIRUB SERPL-MCNC: 0.3 MG/DL (ref 0–1.2)
BUN SERPL-MCNC: 8 MG/DL (ref 6–20)
BUN/CREAT SERPL: 9.9 (ref 7–25)
CALCIUM SPEC-SCNC: 8.7 MG/DL (ref 8.6–10.5)
CHLORIDE SERPL-SCNC: 105 MMOL/L (ref 98–107)
CO2 SERPL-SCNC: 25.6 MMOL/L (ref 22–29)
CREAT SERPL-MCNC: 0.81 MG/DL (ref 0.57–1)
CRP SERPL-MCNC: <0.3 MG/DL (ref 0–0.5)
EGFRCR SERPLBLD CKD-EPI 2021: 100.3 ML/MIN/1.73
ERYTHROCYTE [SEDIMENTATION RATE] IN BLOOD: 4 MM/HR (ref 0–20)
GLOBULIN UR ELPH-MCNC: 2.8 GM/DL
GLUCOSE SERPL-MCNC: 76 MG/DL (ref 65–99)
POTASSIUM SERPL-SCNC: 3.9 MMOL/L (ref 3.5–5.2)
PROT SERPL-MCNC: 6.5 G/DL (ref 6–8.5)
SODIUM SERPL-SCNC: 140 MMOL/L (ref 136–145)
UREA BREATH TEST QL: NEGATIVE

## 2024-05-24 ENCOUNTER — LAB (OUTPATIENT)
Facility: HOSPITAL | Age: 29
End: 2024-05-24
Payer: COMMERCIAL

## 2024-05-24 DIAGNOSIS — D70.8 OTHER NEUTROPENIA: ICD-10-CM

## 2024-05-24 DIAGNOSIS — R53.82 CHRONIC FATIGUE: ICD-10-CM

## 2024-05-24 LAB
BASOPHILS # BLD AUTO: 0.02 10*3/MM3 (ref 0–0.2)
BASOPHILS NFR BLD AUTO: 0.6 % (ref 0–1.5)
DEPRECATED RDW RBC AUTO: 47.7 FL (ref 37–54)
EOSINOPHIL # BLD AUTO: 0.06 10*3/MM3 (ref 0–0.4)
EOSINOPHIL NFR BLD AUTO: 1.9 % (ref 0.3–6.2)
ERYTHROCYTE [DISTWIDTH] IN BLOOD BY AUTOMATED COUNT: 13.3 % (ref 12.3–15.4)
HCT VFR BLD AUTO: 37.3 % (ref 34–46.6)
HGB BLD-MCNC: 12.2 G/DL (ref 12–15.9)
IMM GRANULOCYTES # BLD AUTO: 0.01 10*3/MM3 (ref 0–0.05)
IMM GRANULOCYTES NFR BLD AUTO: 0.3 % (ref 0–0.5)
LYMPHOCYTES # BLD AUTO: 1.13 10*3/MM3 (ref 0.7–3.1)
LYMPHOCYTES NFR BLD AUTO: 35.9 % (ref 19.6–45.3)
MCH RBC QN AUTO: 31.9 PG (ref 26.6–33)
MCHC RBC AUTO-ENTMCNC: 32.7 G/DL (ref 31.5–35.7)
MCV RBC AUTO: 97.6 FL (ref 79–97)
MONOCYTES # BLD AUTO: 0.36 10*3/MM3 (ref 0.1–0.9)
MONOCYTES NFR BLD AUTO: 11.4 % (ref 5–12)
NEUTROPHILS NFR BLD AUTO: 1.57 10*3/MM3 (ref 1.7–7)
NEUTROPHILS NFR BLD AUTO: 49.9 % (ref 42.7–76)
NRBC BLD AUTO-RTO: 0 /100 WBC (ref 0–0.2)
PLATELET # BLD AUTO: 102 10*3/MM3 (ref 140–450)
RBC # BLD AUTO: 3.82 10*6/MM3 (ref 3.77–5.28)
WBC NRBC COR # BLD AUTO: 3.15 10*3/MM3 (ref 3.4–10.8)

## 2024-05-24 PROCEDURE — 85025 COMPLETE CBC W/AUTO DIFF WBC: CPT

## 2024-05-24 PROCEDURE — 36415 COLL VENOUS BLD VENIPUNCTURE: CPT

## 2024-05-28 ENCOUNTER — HOSPITAL ENCOUNTER (OUTPATIENT)
Dept: MRI IMAGING | Facility: HOSPITAL | Age: 29
Discharge: HOME | End: 2024-05-28
Admitting: INTERNAL MEDICINE
Payer: COMMERCIAL

## 2024-05-28 PROCEDURE — 70551 MRI BRAIN STEM W/O DYE: CPT

## 2024-05-31 ENCOUNTER — LAB (OUTPATIENT)
Facility: HOSPITAL | Age: 29
End: 2024-05-31
Payer: COMMERCIAL

## 2024-05-31 DIAGNOSIS — D70.8 OTHER NEUTROPENIA: ICD-10-CM

## 2024-05-31 DIAGNOSIS — R53.82 CHRONIC FATIGUE: ICD-10-CM

## 2024-05-31 LAB
BASOPHILS # BLD AUTO: 0.02 10*3/MM3 (ref 0–0.2)
BASOPHILS NFR BLD AUTO: 0.7 % (ref 0–1.5)
DEPRECATED RDW RBC AUTO: 46.3 FL (ref 37–54)
EOSINOPHIL # BLD AUTO: 0.08 10*3/MM3 (ref 0–0.4)
EOSINOPHIL NFR BLD AUTO: 2.6 % (ref 0.3–6.2)
ERYTHROCYTE [DISTWIDTH] IN BLOOD BY AUTOMATED COUNT: 13.1 % (ref 12.3–15.4)
HCT VFR BLD AUTO: 40.6 % (ref 34–46.6)
HGB BLD-MCNC: 13.3 G/DL (ref 12–15.9)
IMM GRANULOCYTES # BLD AUTO: 0 10*3/MM3 (ref 0–0.05)
IMM GRANULOCYTES NFR BLD AUTO: 0 % (ref 0–0.5)
LYMPHOCYTES # BLD AUTO: 1.02 10*3/MM3 (ref 0.7–3.1)
LYMPHOCYTES NFR BLD AUTO: 33.6 % (ref 19.6–45.3)
MCH RBC QN AUTO: 31.7 PG (ref 26.6–33)
MCHC RBC AUTO-ENTMCNC: 32.8 G/DL (ref 31.5–35.7)
MCV RBC AUTO: 96.7 FL (ref 79–97)
MONOCYTES # BLD AUTO: 0.33 10*3/MM3 (ref 0.1–0.9)
MONOCYTES NFR BLD AUTO: 10.9 % (ref 5–12)
NEUTROPHILS NFR BLD AUTO: 1.59 10*3/MM3 (ref 1.7–7)
NEUTROPHILS NFR BLD AUTO: 52.2 % (ref 42.7–76)
NRBC BLD AUTO-RTO: 0 /100 WBC (ref 0–0.2)
PLATELET # BLD AUTO: 105 10*3/MM3 (ref 140–450)
PMV BLD AUTO: 14.8 FL (ref 6–12)
RBC # BLD AUTO: 4.2 10*6/MM3 (ref 3.77–5.28)
WBC NRBC COR # BLD AUTO: 3.04 10*3/MM3 (ref 3.4–10.8)

## 2024-05-31 PROCEDURE — 85025 COMPLETE CBC W/AUTO DIFF WBC: CPT

## 2024-05-31 PROCEDURE — 36415 COLL VENOUS BLD VENIPUNCTURE: CPT

## 2024-06-10 ENCOUNTER — OFFICE VISIT (OUTPATIENT)
Age: 29
End: 2024-06-10
Payer: COMMERCIAL

## 2024-06-10 VITALS
HEIGHT: 67 IN | OXYGEN SATURATION: 100 % | DIASTOLIC BLOOD PRESSURE: 64 MMHG | WEIGHT: 150 LBS | HEART RATE: 65 BPM | TEMPERATURE: 97.4 F | SYSTOLIC BLOOD PRESSURE: 102 MMHG | BODY MASS INDEX: 23.54 KG/M2

## 2024-06-10 DIAGNOSIS — D69.6 THROMBOCYTOPENIA: ICD-10-CM

## 2024-06-10 DIAGNOSIS — D70.8 OTHER NEUTROPENIA: Primary | ICD-10-CM

## 2024-06-10 PROCEDURE — 99213 OFFICE O/P EST LOW 20 MIN: CPT | Performed by: INTERNAL MEDICINE

## 2024-06-10 PROCEDURE — 1126F AMNT PAIN NOTED NONE PRSNT: CPT | Performed by: INTERNAL MEDICINE

## 2024-06-10 NOTE — LETTER
Sindy 10, 2024       No Recipients    Patient: Donald Bennett   YOB: 1995   Date of Visit: 6/10/2024     Dear JEANNE Yee:       Thank you for referring Donald Bennett to me for evaluation. Below are the relevant portions of my assessment and plan of care.    If you have questions, please do not hesitate to call me. I look forward to following Donald along with you.         Sincerely,        Yosvany Caruso MD        CC:   No Recipients    Yosvany Caruso MD  06/10/24 1056  Sign when Signing Visit  PROBLEM LIST:  Oncology/Hematology History    No history exists.       REASON FOR VISIT: Mild neutropenia and thrombocytopenia    HISTORY OF PRESENT ILLNESS:   29 y.o.  female presents today for follow-up.  I did weekly labs on her to see if she is cyclic.  She has some vague symptoms that she experiences.  Did have an infection recently.  But overall relatively stable.    Past medical history, social history and family history was reviewed 06/10/24 and unchanged from prior visit.    Review of Systems:    Review of Systems - Oncology         Medications:        Current Outpatient Medications:   •  meclizine (ANTIVERT) 25 MG tablet, Take 1/2-1 tablet by mouth 3 times daily as needed for dizziness., Disp: 60 tablet, Rfl: 1  •  multivitamin (Multiple Vitamin) tablet tablet, Take 1 tablet by mouth Daily., Disp: 30 tablet, Rfl: 5  •  Nutritional Supplements (NUTRITIONAL SUPPLEMENT PO), Take  by mouth. S'moo for PCOS, Disp: , Rfl:   •  ondansetron ODT (ZOFRAN-ODT) 4 MG disintegrating tablet, Place 1 tablet on the tongue Every 8 (Eight) Hours As Needed for Nausea or Vomiting., Disp: 30 tablet, Rfl: 5  •  simethicone (Gas-X Extra Strength) 125 MG capsule capsule, Take 1 capsule by mouth Every 6 (Six) Hours As Needed (bloating)., Disp: 60 capsule, Rfl: 5  •  spironolactone (ALDACTONE) 100 MG tablet, , Disp: , Rfl:            ALLERGIES:  No Known Allergies      Physical Exam    VITAL  "SIGNS:  /64   Pulse 65   Temp 97.4 °F (36.3 °C) (Temporal)   Ht 170.2 cm (67.01\")   Wt 68 kg (150 lb)   SpO2 100%   BMI 23.49 kg/m²     ECOG score: 0           Wt Readings from Last 3 Encounters:   06/10/24 68 kg (150 lb)   05/22/24 68.5 kg (151 lb)   05/10/24 67.6 kg (149 lb)       Body mass index is 23.49 kg/m². Body surface area is 1.79 meters squared.       Performance Status: 0    General: well appearing, in no acute distress  HEENT: sclera anicteric, neck is supple  Skin: no rashes, lesions, bruising, or petechiae  Msk:  Shows no weakness of the large muscle groups  Psych: Mood is stable        RECENT LABS:    Lab Results   Component Value Date    HGB 13.3 05/31/2024    HCT 40.6 05/31/2024    MCV 96.7 05/31/2024     (L) 05/31/2024    WBC 3.04 (L) 05/31/2024    NEUTROABS 1.59 (L) 05/31/2024    LYMPHSABS 1.02 05/31/2024    MONOSABS 0.33 05/31/2024    EOSABS 0.08 05/31/2024    BASOSABS 0.02 05/31/2024       Lab Results   Component Value Date    GLUCOSE 76 05/22/2024    BUN 8 05/22/2024    CREATININE 0.81 05/22/2024     05/22/2024    K 3.9 05/22/2024     05/22/2024    CO2 25.6 05/22/2024    CALCIUM 8.7 05/22/2024    PROTEINTOT 6.5 05/22/2024    ALBUMIN 3.7 05/22/2024    BILITOT 0.3 05/22/2024    ALKPHOS 40 05/22/2024    AST 14 05/22/2024    ALT 15 05/22/2024     Lab Results   Component Value Date    NEUTROABS 1.59 (L) 05/31/2024    NEUTROABS 1.57 (L) 05/24/2024    NEUTROABS 1.90 05/17/2024    NEUTROABS 1.11 (L) 05/10/2024    NEUTROABS 1.20 (L) 02/16/2024         MRI Brain Without Contrast    Result Date: 5/28/2024  Impression: Negative. Electronically Signed: Yamilet Steel MD  5/28/2024 11:53 AM EDT  Workstation ID: [id number]    US Abdomen Limited    Result Date: 3/8/2024  Impression: No sonographic abnormality in the right upper quadrant or spleen Electronically Signed: Edgardo Lopez MD  3/8/2024 5:33 PM EST  Workstation ID: [id number]    US Spleen    Result Date: " 3/8/2024  Impression: No sonographic abnormality in the right upper quadrant or spleen Electronically Signed: Edgardo Lopez MD  3/8/2024 5:33 PM EST  Workstation ID: [id number]          Assessment/Plan    1.  Mild neutropenia and thrombocytopenia.  I suspect she has autoimmune process.  She is LESTER negative.  But it is fairly mild and her numbers are not low enough to put her on steroids.  For now watchful waiting alone.  Plan to recheck her in 4 months.          Yosvany Caruso MD  [facility] Hematology and Oncology         No orders of the defined types were placed in this encounter.      6/10/2024     Future Appointments         Provider Department Center    6/13/2024 9:00 AM Megan Wharton APRN [facility] PRIMARY CARE [facility]    8/28/2024 8:30 AM (Arrive by 8:00 AM) Liyah Doan APRN [facility] GASTROENTEROLOGY [facility]    10/14/2024 9:15 AM Yosvany Caruso MD [facility] HEMATOLOGY  & ONCOLOGY [facility]

## 2024-06-10 NOTE — PROGRESS NOTES
"PROBLEM LIST:  Oncology/Hematology History    No history exists.       REASON FOR VISIT: Mild neutropenia and thrombocytopenia    HISTORY OF PRESENT ILLNESS:   29 y.o.  female presents today for follow-up.  I did weekly labs on her to see if she is cyclic.  She has some vague symptoms that she experiences.  Did have an infection recently.  But overall relatively stable.    Past medical history, social history and family history was reviewed 06/10/24 and unchanged from prior visit.    Review of Systems:    Review of Systems - Oncology         Medications:        Current Outpatient Medications:     meclizine (ANTIVERT) 25 MG tablet, Take 1/2-1 tablet by mouth 3 times daily as needed for dizziness., Disp: 60 tablet, Rfl: 1    multivitamin (Multiple Vitamin) tablet tablet, Take 1 tablet by mouth Daily., Disp: 30 tablet, Rfl: 5    Nutritional Supplements (NUTRITIONAL SUPPLEMENT PO), Take  by mouth. S'moo for PCOS, Disp: , Rfl:     ondansetron ODT (ZOFRAN-ODT) 4 MG disintegrating tablet, Place 1 tablet on the tongue Every 8 (Eight) Hours As Needed for Nausea or Vomiting., Disp: 30 tablet, Rfl: 5    simethicone (Gas-X Extra Strength) 125 MG capsule capsule, Take 1 capsule by mouth Every 6 (Six) Hours As Needed (bloating)., Disp: 60 capsule, Rfl: 5    spironolactone (ALDACTONE) 100 MG tablet, , Disp: , Rfl:            ALLERGIES:  No Known Allergies      Physical Exam    VITAL SIGNS:  /64   Pulse 65   Temp 97.4 °F (36.3 °C) (Temporal)   Ht 170.2 cm (67.01\")   Wt 68 kg (150 lb)   SpO2 100%   BMI 23.49 kg/m²     ECOG score: 0           Wt Readings from Last 3 Encounters:   06/10/24 68 kg (150 lb)   05/22/24 68.5 kg (151 lb)   05/10/24 67.6 kg (149 lb)       Body mass index is 23.49 kg/m². Body surface area is 1.79 meters squared.       Performance Status: 0    General: well appearing, in no acute distress  HEENT: sclera anicteric, neck is supple  Skin: no rashes, lesions, bruising, or petechiae  Msk:  Shows no " weakness of the large muscle groups  Psych: Mood is stable        RECENT LABS:    Lab Results   Component Value Date    HGB 13.3 05/31/2024    HCT 40.6 05/31/2024    MCV 96.7 05/31/2024     (L) 05/31/2024    WBC 3.04 (L) 05/31/2024    NEUTROABS 1.59 (L) 05/31/2024    LYMPHSABS 1.02 05/31/2024    MONOSABS 0.33 05/31/2024    EOSABS 0.08 05/31/2024    BASOSABS 0.02 05/31/2024       Lab Results   Component Value Date    GLUCOSE 76 05/22/2024    BUN 8 05/22/2024    CREATININE 0.81 05/22/2024     05/22/2024    K 3.9 05/22/2024     05/22/2024    CO2 25.6 05/22/2024    CALCIUM 8.7 05/22/2024    PROTEINTOT 6.5 05/22/2024    ALBUMIN 3.7 05/22/2024    BILITOT 0.3 05/22/2024    ALKPHOS 40 05/22/2024    AST 14 05/22/2024    ALT 15 05/22/2024     Lab Results   Component Value Date    NEUTROABS 1.59 (L) 05/31/2024    NEUTROABS 1.57 (L) 05/24/2024    NEUTROABS 1.90 05/17/2024    NEUTROABS 1.11 (L) 05/10/2024    NEUTROABS 1.20 (L) 02/16/2024         MRI Brain Without Contrast    Result Date: 5/28/2024  Impression: Negative. Electronically Signed: Yamilet Steel MD  5/28/2024 11:53 AM EDT  Workstation ID: [id number]    US Abdomen Limited    Result Date: 3/8/2024  Impression: No sonographic abnormality in the right upper quadrant or spleen Electronically Signed: Edgardo Lopez MD  3/8/2024 5:33 PM EST  Workstation ID: [id number]    US Spleen    Result Date: 3/8/2024  Impression: No sonographic abnormality in the right upper quadrant or spleen Electronically Signed: Edgardo Lopez MD  3/8/2024 5:33 PM EST  Workstation ID: [id number]          Assessment/Plan    1.  Mild neutropenia and thrombocytopenia.  I suspect she has autoimmune process.  She is LESTER negative.  But it is fairly mild and her numbers are not low enough to put her on steroids.  For now watchful waiting alone.  Plan to recheck her in 4 months.          Yosvany Caruso MD  [facility] Hematology and Oncology         No orders of the defined types  were placed in this encounter.      6/10/2024     Future Appointments         Provider Department Center    6/13/2024 9:00 AM Megan Wharton APRN [facility] PRIMARY CARE [facility]    8/28/2024 8:30 AM (Arrive by 8:00 AM) Liyah Doan APRN [facility] GASTROENTEROLOGY [facility]    10/14/2024 9:15 AM Yosvany Caruso MD [facility] HEMATOLOGY  & ONCOLOGY [facility]

## 2024-06-13 ENCOUNTER — LAB (OUTPATIENT)
Dept: INTERNAL MEDICINE | Facility: CLINIC | Age: 29
End: 2024-06-13
Payer: COMMERCIAL

## 2024-06-13 ENCOUNTER — OFFICE VISIT (OUTPATIENT)
Dept: INTERNAL MEDICINE | Facility: CLINIC | Age: 29
End: 2024-06-13
Payer: COMMERCIAL

## 2024-06-13 VITALS
SYSTOLIC BLOOD PRESSURE: 116 MMHG | WEIGHT: 152 LBS | HEART RATE: 70 BPM | OXYGEN SATURATION: 100 % | DIASTOLIC BLOOD PRESSURE: 68 MMHG | HEIGHT: 67 IN | TEMPERATURE: 97.3 F | BODY MASS INDEX: 23.86 KG/M2

## 2024-06-13 DIAGNOSIS — D50.9 IRON DEFICIENCY ANEMIA, UNSPECIFIED IRON DEFICIENCY ANEMIA TYPE: ICD-10-CM

## 2024-06-13 DIAGNOSIS — D50.9 IRON DEFICIENCY ANEMIA, UNSPECIFIED IRON DEFICIENCY ANEMIA TYPE: Primary | ICD-10-CM

## 2024-06-13 DIAGNOSIS — N92.6 IRREGULAR MENSTRUAL CYCLE: Primary | ICD-10-CM

## 2024-06-13 DIAGNOSIS — R25.2 MUSCLE CRAMP: ICD-10-CM

## 2024-06-13 DIAGNOSIS — D72.819 CHRONIC LEUKOPENIA: ICD-10-CM

## 2024-06-13 LAB
ALBUMIN SERPL-MCNC: 4.1 G/DL (ref 3.5–5.2)
ALBUMIN/GLOB SERPL: 1.5 G/DL
ALP SERPL-CCNC: 41 U/L (ref 39–117)
ALT SERPL W P-5'-P-CCNC: 14 U/L (ref 1–33)
ANION GAP SERPL CALCULATED.3IONS-SCNC: 8.6 MMOL/L (ref 5–15)
AST SERPL-CCNC: 15 U/L (ref 1–32)
BILIRUB SERPL-MCNC: 0.8 MG/DL (ref 0–1.2)
BUN SERPL-MCNC: 10 MG/DL (ref 6–20)
BUN/CREAT SERPL: 12.8 (ref 7–25)
CALCIUM SPEC-SCNC: 9.3 MG/DL (ref 8.6–10.5)
CHLORIDE SERPL-SCNC: 105 MMOL/L (ref 98–107)
CO2 SERPL-SCNC: 24.4 MMOL/L (ref 22–29)
CREAT SERPL-MCNC: 0.78 MG/DL (ref 0.57–1)
EGFRCR SERPLBLD CKD-EPI 2021: 104.9 ML/MIN/1.73
FSH SERPL-ACNC: 7.13 MIU/ML
GLOBULIN UR ELPH-MCNC: 2.7 GM/DL
GLUCOSE SERPL-MCNC: 84 MG/DL (ref 65–99)
HBA1C MFR BLD: 5.2 % (ref 4.8–5.6)
LH SERPL-ACNC: 18.9 MIU/ML
MAGNESIUM SERPL-MCNC: 2.2 MG/DL (ref 1.6–2.6)
POTASSIUM SERPL-SCNC: 4.3 MMOL/L (ref 3.5–5.2)
PROGEST SERPL-MCNC: 0.21 NG/ML
PROT SERPL-MCNC: 6.8 G/DL (ref 6–8.5)
SODIUM SERPL-SCNC: 138 MMOL/L (ref 136–145)
TESTOST SERPL-MCNC: 66.1 NG/DL (ref 8.4–48.1)

## 2024-06-13 PROCEDURE — 83036 HEMOGLOBIN GLYCOSYLATED A1C: CPT | Performed by: NURSE PRACTITIONER

## 2024-06-13 PROCEDURE — 84144 ASSAY OF PROGESTERONE: CPT | Performed by: NURSE PRACTITIONER

## 2024-06-13 PROCEDURE — 1126F AMNT PAIN NOTED NONE PRSNT: CPT | Performed by: NURSE PRACTITIONER

## 2024-06-13 PROCEDURE — 82670 ASSAY OF TOTAL ESTRADIOL: CPT | Performed by: NURSE PRACTITIONER

## 2024-06-13 PROCEDURE — 80053 COMPREHEN METABOLIC PANEL: CPT | Performed by: NURSE PRACTITIONER

## 2024-06-13 PROCEDURE — 82679 ASSAY OF ESTRONE: CPT | Performed by: NURSE PRACTITIONER

## 2024-06-13 PROCEDURE — 36415 COLL VENOUS BLD VENIPUNCTURE: CPT | Performed by: NURSE PRACTITIONER

## 2024-06-13 PROCEDURE — 83525 ASSAY OF INSULIN: CPT | Performed by: NURSE PRACTITIONER

## 2024-06-13 PROCEDURE — 83735 ASSAY OF MAGNESIUM: CPT | Performed by: NURSE PRACTITIONER

## 2024-06-13 PROCEDURE — 82626 DEHYDROEPIANDROSTERONE: CPT | Performed by: NURSE PRACTITIONER

## 2024-06-13 PROCEDURE — 99214 OFFICE O/P EST MOD 30 MIN: CPT | Performed by: NURSE PRACTITIONER

## 2024-06-13 PROCEDURE — 83001 ASSAY OF GONADOTROPIN (FSH): CPT | Performed by: NURSE PRACTITIONER

## 2024-06-13 PROCEDURE — 83002 ASSAY OF GONADOTROPIN (LH): CPT | Performed by: NURSE PRACTITIONER

## 2024-06-13 PROCEDURE — 84403 ASSAY OF TOTAL TESTOSTERONE: CPT | Performed by: NURSE PRACTITIONER

## 2024-06-13 PROCEDURE — 1159F MED LIST DOCD IN RCRD: CPT | Performed by: NURSE PRACTITIONER

## 2024-06-13 PROCEDURE — 1160F RVW MEDS BY RX/DR IN RCRD: CPT | Performed by: NURSE PRACTITIONER

## 2024-06-13 NOTE — PROGRESS NOTES
"Subjective   Chief Complaint   Patient presents with    Iron deficiency anemia, unspecified iron deficiency anemia      3 month follow-up      Donald Bennett is a 29 y.o. female.     History of Present Illness    Patient consulted with hematology for low WBC and platelets. She was mildly anemic about 4 months ago. Hematology has not found anything concerning and suggested future follow up. She saw primary care on 5/22/24 for dizziness, bloating, nausea. She was referred to GI and prescribed zofran and simethacone. MRI of brain was ordered. She's having irregular menstrual cycles and would like referral to different gynecologist. Reports having some leg and muscle cramps intermittently.     I have reviewed the following portions of the patient's history and confirmed they are accurate: allergies, current medications, past family history, past medical history, past social history, past surgical history, and problem list    Review of Systems  Pertinent items are noted in HPI.     Current Outpatient Medications on File Prior to Visit   Medication Sig    meclizine (ANTIVERT) 25 MG tablet Take 1/2-1 tablet by mouth 3 times daily as needed for dizziness.    multivitamin (Multiple Vitamin) tablet tablet Take 1 tablet by mouth Daily.    Nutritional Supplements (NUTRITIONAL SUPPLEMENT PO) Take  by mouth. S'moo for PCOS    ondansetron ODT (ZOFRAN-ODT) 4 MG disintegrating tablet Place 1 tablet on the tongue Every 8 (Eight) Hours As Needed for Nausea or Vomiting.    simethicone (Gas-X Extra Strength) 125 MG capsule capsule Take 1 capsule by mouth Every 6 (Six) Hours As Needed (bloating).    spironolactone (ALDACTONE) 100 MG tablet      No current facility-administered medications on file prior to visit.       Objective   Vitals:    06/13/24 0907   BP: 116/68   Pulse: 70   Temp: 97.3 °F (36.3 °C)   TempSrc: Temporal   SpO2: 100%   Weight: 68.9 kg (152 lb)   Height: 170.2 cm (67\")     Body mass index is 23.81 " kg/m².    Physical Exam  Vitals reviewed.   Constitutional:       Appearance: Normal appearance. She is well-developed.   HENT:      Head: Normocephalic and atraumatic.      Nose: Nose normal.   Eyes:      General: Lids are normal.      Conjunctiva/sclera: Conjunctivae normal.      Pupils: Pupils are equal, round, and reactive to light.   Neck:      Thyroid: No thyromegaly.      Trachea: Trachea normal.   Pulmonary:      Effort: Pulmonary effort is normal. No respiratory distress.   Skin:     General: Skin is warm and dry.   Neurological:      Mental Status: She is alert and oriented to person, place, and time.      GCS: GCS eye subscore is 4. GCS verbal subscore is 5. GCS motor subscore is 6.   Psychiatric:         Attention and Perception: Attention normal.         Mood and Affect: Mood normal.         Speech: Speech normal.         Behavior: Behavior normal. Behavior is cooperative.         Results      Assessment & Plan   Problem List Items Addressed This Visit       Chronic leukopenia    Irregular menstrual cycle - Primary    Relevant Orders    Ambulatory Referral to Obstetrics / Gynecology (Completed)    FSH & LH    Estrogens, Fractionated    Progesterone    Testosterone    DHEA    Comprehensive Metabolic Panel    Hemoglobin A1c    Insulin, Total    Iron deficiency anemia     Other Visit Diagnoses       Muscle cramp        Relevant Orders    Magnesium    Comprehensive Metabolic Panel               Current Outpatient Medications:     meclizine (ANTIVERT) 25 MG tablet, Take 1/2-1 tablet by mouth 3 times daily as needed for dizziness., Disp: 60 tablet, Rfl: 1    multivitamin (Multiple Vitamin) tablet tablet, Take 1 tablet by mouth Daily., Disp: 30 tablet, Rfl: 5    Nutritional Supplements (NUTRITIONAL SUPPLEMENT PO), Take  by mouth. S'moo for PCOS, Disp: , Rfl:     ondansetron ODT (ZOFRAN-ODT) 4 MG disintegrating tablet, Place 1 tablet on the tongue Every 8 (Eight) Hours As Needed for Nausea or Vomiting., Disp:  30 tablet, Rfl: 5    simethicone (Gas-X Extra Strength) 125 MG capsule capsule, Take 1 capsule by mouth Every 6 (Six) Hours As Needed (bloating)., Disp: 60 capsule, Rfl: 5    spironolactone (ALDACTONE) 100 MG tablet, , Disp: , Rfl:     Assessment & Plan    Irregular menstrual cycle  Chronic unstable. Completing hormone panel and PCOS lab work up. Referring to gynecology for consult    Iron deficiency anemia  Chronic stable. Continue high iron diet. Checking CMP. Reviewed recent CBC.     Chronic leukopenia  Chronic stable. Checking CMP. Reviewed recent CBC. Continue follow ups with hematology.    Muscle Cramps  New unstable. Continue with adequate fluid intake including electrolyte based drinks. Checking CMP and magnesium levels.          Plan of care reviewed with the patient at the conclusion of today's visit.  Education was provided regarding diagnosis, management, and any prescribed or recommended OTC medications.  Patient verbalized understanding of and agreement with management plan.     Return in about 3 months (around 9/13/2024), or if symptoms worsen or fail to improve, for Follow-up.

## 2024-06-16 LAB — INSULIN SERPL-ACNC: 4.5 UIU/ML (ref 2.6–24.9)

## 2024-06-18 LAB
ESTRADIOL SERPL-MCNC: 56.7 PG/ML
ESTRONE SERPL-MCNC: 97 PG/ML (ref 27–231)

## 2024-06-21 LAB — DHEA SERPL-MCNC: 869 NG/DL (ref 31–701)

## 2024-08-27 NOTE — PROGRESS NOTES
New Patient Consultation     Patient Name: Donald Bennett  : 1995   MRN: [id number]     Chief Complaint:    Chief Complaint   Patient presents with    bloating & nausea       History of Present Illness: Donald Bennett is a 29 y.o. female who is here today for a Gastroenterology Consultation for nausea and bloating    Paul reports bloating, gassiness, abdominal discomfort since childhood.  She does she is sensitive to dairy but avoiding dairy does not resolve her bloating.  She does have postprandial urgency to defecate.  Stools are mostly formed but loose or soft at times.    There is heartburn on occasion.  She has nausea when she is lightheaded or dizzy.      She does not use tobacco.       US Abdomen Limited (2024 16:01) No sonographic abnormality in the right upper quadrant or spleen     No abdominal surgical hx  Subjective      Review of Systems:   Review of Systems   Constitutional:  Negative for appetite change and unexpected weight loss.   HENT:  Negative for trouble swallowing.    Gastrointestinal:  Positive for abdominal distention, abdominal pain, diarrhea and nausea. Negative for anal bleeding, blood in stool, constipation, rectal pain, vomiting, GERD and indigestion.       Past Medical History:   Past Medical History:   Diagnosis Date    Anemia     PCOS (polycystic ovarian syndrome)        Past Surgical History: History reviewed. No pertinent surgical history.    Family History:   Family History   Problem Relation Age of Onset    Hypertension Mother        Social History:   Social History     Socioeconomic History    Marital status: Single   Tobacco Use    Smoking status: Never    Smokeless tobacco: Never   Vaping Use    Vaping status: Never Used   Substance and Sexual Activity    Alcohol use: Never    Drug use: Never    Sexual activity: Never       Alcohol/Tobacco History:   Social History     Substance and Sexual Activity   Alcohol Use Never     Social History     Tobacco  "Use   Smoking Status Never   Smokeless Tobacco Never       Medications:     Current Outpatient Medications:     riFAXIMin (XIFAXAN) 550 MG tablet, Take 1 tablet by mouth 3 (Three) Times a Day., Disp: 42 tablet, Rfl: 0    Allergies:   No Known Allergies    Objective     Physical Exam:  Vital Signs:   Vitals:    08/28/24 0838   BP: 110/70   BP Location: Right arm   Patient Position: Sitting   Cuff Size: Adult   Pulse: 89   Temp: 97.8 °F (36.6 °C)   TempSrc: Temporal   SpO2: 99%   Weight: 72.1 kg (159 lb)   Height: 167.6 cm (66\")     Body mass index is 25.66 kg/m².     Physical Exam  Vitals and nursing note reviewed.   Constitutional:       General: She is not in acute distress.     Appearance: She is well-developed. She is not diaphoretic.   Eyes:      General: No scleral icterus.  Neck:      Thyroid: No thyromegaly.   Pulmonary:      Effort: Pulmonary effort is normal.   Abdominal:      General: Bowel sounds are normal. There is distension.      Palpations: Abdomen is soft.      Tenderness: There is no abdominal tenderness. There is no guarding or rebound.      Hernia: No hernia is present.   Musculoskeletal:      Right lower leg: No edema.      Left lower leg: No edema.   Skin:     General: Skin is warm and dry.      Capillary Refill: Capillary refill takes 2 to 3 seconds.      Coloration: Skin is not jaundiced or pale.      Findings: No bruising or petechiae.      Nails: There is no clubbing.   Neurological:      Mental Status: She is alert and oriented to person, place, and time.   Psychiatric:         Behavior: Behavior normal.         Thought Content: Thought content normal.         Judgment: Judgment normal.         Assessment / Plan      Assessment/Plan:   Diagnoses and all orders for this visit:    1. Bloating (Primary)  -     Ova & Parasite Examination - Stool, Per Rectum; Future  -     Pancreatic Elastase, Fecal - Stool, Per Rectum; Future  -     Calprotectin, Fecal - Stool, Per Rectum; Future  -     " Cancel: US Gallbladder; Future    2. Irritable bowel syndrome with diarrhea  -     riFAXIMin (XIFAXAN) 550 MG tablet; Take 1 tablet by mouth 3 (Three) Times a Day.  Dispense: 42 tablet; Refill: 0    Perform the above stools and I have also given her a sucrase isomaltase breath test to submit  She will then start Xifaxan  If Xifaxan not beneficial-start low FODMAP diet- handout given    Follow Up:   Return in about 8 weeks (around 10/23/2024), or if symptoms worsen or fail to improve.    Plan of care reviewed with the patient at the conclusion of today's visit.  Education was provided regarding diagnosis, management, and any prescribed or recommended OTC medications.  Patient verbalized understanding of and agreement with management plan.       JEANNE Han  [facility]

## 2024-08-28 ENCOUNTER — OFFICE VISIT (OUTPATIENT)
Dept: GASTROENTEROLOGY | Facility: CLINIC | Age: 29
End: 2024-08-28
Payer: COMMERCIAL

## 2024-08-28 VITALS
WEIGHT: 159 LBS | OXYGEN SATURATION: 99 % | SYSTOLIC BLOOD PRESSURE: 110 MMHG | HEART RATE: 89 BPM | TEMPERATURE: 97.8 F | DIASTOLIC BLOOD PRESSURE: 70 MMHG | BODY MASS INDEX: 25.55 KG/M2 | HEIGHT: 66 IN

## 2024-08-28 DIAGNOSIS — R14.0 BLOATING: Primary | ICD-10-CM

## 2024-08-28 DIAGNOSIS — K58.0 IRRITABLE BOWEL SYNDROME WITH DIARRHEA: ICD-10-CM

## 2024-08-28 PROBLEM — G44.219 EPISODIC TENSION-TYPE HEADACHE, NOT INTRACTABLE: Status: ACTIVE | Noted: 2017-10-02

## 2024-08-28 PROBLEM — N88.8 NABOTHIAN CYST: Status: ACTIVE | Noted: 2022-11-22

## 2024-08-28 PROBLEM — K90.9 IRON MALABSORPTION: Status: ACTIVE | Noted: 2018-01-19

## 2024-08-28 PROBLEM — N93.9 ABNORMAL UTERINE BLEEDING: Status: ACTIVE | Noted: 2022-11-22

## 2024-08-28 PROBLEM — M25.571 CHRONIC PAIN OF RIGHT ANKLE: Status: ACTIVE | Noted: 2017-10-02

## 2024-08-28 PROBLEM — G89.29 CHRONIC PAIN OF RIGHT ANKLE: Status: ACTIVE | Noted: 2017-10-02

## 2024-08-28 PROBLEM — E28.2 PCOS (POLYCYSTIC OVARIAN SYNDROME): Status: ACTIVE | Noted: 2022-11-22

## 2024-08-28 PROCEDURE — 99214 OFFICE O/P EST MOD 30 MIN: CPT | Performed by: NURSE PRACTITIONER

## 2024-08-28 PROCEDURE — 1160F RVW MEDS BY RX/DR IN RCRD: CPT | Performed by: NURSE PRACTITIONER

## 2024-08-28 PROCEDURE — 1159F MED LIST DOCD IN RCRD: CPT | Performed by: NURSE PRACTITIONER

## 2024-08-29 ENCOUNTER — PRIOR AUTHORIZATION (OUTPATIENT)
Dept: GASTROENTEROLOGY | Facility: CLINIC | Age: 29
End: 2024-08-29
Payer: COMMERCIAL

## 2024-08-29 NOTE — TELEPHONE ENCOUNTER
Xifaxan approved.    Xifaxan 550MG tablets    The request has been approved.     The authorization is effective for a maximum of 3 fills from 08/29/2024 to 08/29/2025, as long as the member is enrolled in their current health plan.     A written notification letter will follow with additional details..     Authorization Expiration Date: August 29, 2025.

## 2024-09-04 PROCEDURE — 87177 OVA AND PARASITES SMEARS: CPT

## 2024-09-04 PROCEDURE — 87209 SMEAR COMPLEX STAIN: CPT

## 2024-09-04 PROCEDURE — 83993 ASSAY FOR CALPROTECTIN FECAL: CPT

## 2024-09-04 PROCEDURE — 82653 EL-1 FECAL QUANTITATIVE: CPT

## 2024-09-05 ENCOUNTER — LAB (OUTPATIENT)
Dept: LAB | Facility: HOSPITAL | Age: 29
End: 2024-09-05
Payer: COMMERCIAL

## 2024-09-05 DIAGNOSIS — R14.0 BLOATING: ICD-10-CM

## 2024-09-06 ENCOUNTER — OFFICE VISIT (OUTPATIENT)
Dept: INTERNAL MEDICINE | Facility: CLINIC | Age: 29
End: 2024-09-06
Payer: COMMERCIAL

## 2024-09-06 VITALS
TEMPERATURE: 97.8 F | HEART RATE: 67 BPM | HEIGHT: 66 IN | SYSTOLIC BLOOD PRESSURE: 104 MMHG | DIASTOLIC BLOOD PRESSURE: 80 MMHG | WEIGHT: 156.8 LBS | BODY MASS INDEX: 25.2 KG/M2 | OXYGEN SATURATION: 100 %

## 2024-09-06 DIAGNOSIS — L40.9 PSORIASIS: Primary | ICD-10-CM

## 2024-09-06 DIAGNOSIS — L30.9 ECZEMA, UNSPECIFIED TYPE: ICD-10-CM

## 2024-09-06 DIAGNOSIS — J30.89 ENVIRONMENTAL AND SEASONAL ALLERGIES: ICD-10-CM

## 2024-09-06 PROCEDURE — 99214 OFFICE O/P EST MOD 30 MIN: CPT | Performed by: NURSE PRACTITIONER

## 2024-09-06 PROCEDURE — 1160F RVW MEDS BY RX/DR IN RCRD: CPT | Performed by: NURSE PRACTITIONER

## 2024-09-06 PROCEDURE — 1159F MED LIST DOCD IN RCRD: CPT | Performed by: NURSE PRACTITIONER

## 2024-09-06 PROCEDURE — 1126F AMNT PAIN NOTED NONE PRSNT: CPT | Performed by: NURSE PRACTITIONER

## 2024-09-06 RX ORDER — CLOBETASOL PROPIONATE 0.5 MG/ML
SOLUTION TOPICAL 2 TIMES DAILY
Qty: 50 ML | Refills: 1 | Status: SHIPPED | OUTPATIENT
Start: 2024-09-06

## 2024-09-06 RX ORDER — CETIRIZINE HYDROCHLORIDE 10 MG/1
10 TABLET ORAL NIGHTLY
Qty: 30 TABLET | Refills: 5 | Status: SHIPPED | OUTPATIENT
Start: 2024-09-06

## 2024-09-06 RX ORDER — PREDNISONE 10 MG/1
TABLET ORAL
Qty: 21 TABLET | Refills: 0 | Status: SHIPPED | OUTPATIENT
Start: 2024-09-06

## 2024-09-06 RX ORDER — TRIAMCINOLONE ACETONIDE 1 MG/G
1 CREAM TOPICAL 2 TIMES DAILY PRN
Qty: 453 G | Refills: 1 | Status: SHIPPED | OUTPATIENT
Start: 2024-09-06

## 2024-09-08 LAB
O+P SPEC MICRO: NORMAL
O+P STL TRI STN: NORMAL

## 2024-09-09 LAB
CALPROTECTIN STL-MCNT: 16 UG/G (ref 0–120)
ELASTASE PANC STL-MCNT: 489 UG ELAST./G

## 2024-10-11 ENCOUNTER — LAB (OUTPATIENT)
Facility: HOSPITAL | Age: 29
End: 2024-10-11
Payer: MEDICAID

## 2024-10-11 DIAGNOSIS — D70.8 OTHER NEUTROPENIA: ICD-10-CM

## 2024-10-11 DIAGNOSIS — R53.82 CHRONIC FATIGUE: ICD-10-CM

## 2024-10-11 LAB
BASOPHILS # BLD AUTO: 0.03 10*3/MM3 (ref 0–0.2)
BASOPHILS NFR BLD AUTO: 1 % (ref 0–1.5)
DEPRECATED RDW RBC AUTO: 43.2 FL (ref 37–54)
EOSINOPHIL # BLD AUTO: 0.1 10*3/MM3 (ref 0–0.4)
EOSINOPHIL NFR BLD AUTO: 3.2 % (ref 0.3–6.2)
ERYTHROCYTE [DISTWIDTH] IN BLOOD BY AUTOMATED COUNT: 12.1 % (ref 12.3–15.4)
HCT VFR BLD AUTO: 40.1 % (ref 34–46.6)
HGB BLD-MCNC: 13.7 G/DL (ref 12–15.9)
IMM GRANULOCYTES # BLD AUTO: 0 10*3/MM3 (ref 0–0.05)
IMM GRANULOCYTES NFR BLD AUTO: 0 % (ref 0–0.5)
LARGE PLATELETS: NORMAL
LYMPHOCYTES # BLD AUTO: 1.44 10*3/MM3 (ref 0.7–3.1)
LYMPHOCYTES NFR BLD AUTO: 46.5 % (ref 19.6–45.3)
MCH RBC QN AUTO: 32.6 PG (ref 26.6–33)
MCHC RBC AUTO-ENTMCNC: 34.2 G/DL (ref 31.5–35.7)
MCV RBC AUTO: 95.5 FL (ref 79–97)
MONOCYTES # BLD AUTO: 0.39 10*3/MM3 (ref 0.1–0.9)
MONOCYTES NFR BLD AUTO: 12.6 % (ref 5–12)
NEUTROPHILS NFR BLD AUTO: 1.14 10*3/MM3 (ref 1.7–7)
NEUTROPHILS NFR BLD AUTO: 36.7 % (ref 42.7–76)
PLATELET # BLD AUTO: 114 10*3/MM3 (ref 140–450)
PMV BLD AUTO: 14.5 FL (ref 6–12)
RBC # BLD AUTO: 4.2 10*6/MM3 (ref 3.77–5.28)
RBC MORPH BLD: NORMAL
SMALL PLATELETS BLD QL SMEAR: NORMAL
WBC MORPH BLD: NORMAL
WBC NRBC COR # BLD AUTO: 3.1 10*3/MM3 (ref 3.4–10.8)

## 2024-10-11 PROCEDURE — 85007 BL SMEAR W/DIFF WBC COUNT: CPT

## 2024-10-11 PROCEDURE — 36415 COLL VENOUS BLD VENIPUNCTURE: CPT

## 2024-10-11 PROCEDURE — 85025 COMPLETE CBC W/AUTO DIFF WBC: CPT

## 2024-10-14 ENCOUNTER — TELEPHONE (OUTPATIENT)
Age: 29
End: 2024-10-14

## 2024-10-14 NOTE — TELEPHONE ENCOUNTER
Hub staff attempted to follow warm transfer process and was unsuccessful     Caller: Donald Bennett    Relationship to patient: Self    Best call back number: [phone]    Patient is needing: REQUESTING TO CANCEL APPOINTMENT TODAY AND WILL CALL BACK TO RESCHEDULE

## 2025-01-20 ENCOUNTER — LAB (OUTPATIENT)
Dept: INTERNAL MEDICINE | Facility: CLINIC | Age: 30
End: 2025-01-20
Payer: COMMERCIAL

## 2025-01-20 ENCOUNTER — OFFICE VISIT (OUTPATIENT)
Dept: INTERNAL MEDICINE | Facility: CLINIC | Age: 30
End: 2025-01-20
Payer: COMMERCIAL

## 2025-01-20 ENCOUNTER — HOSPITAL ENCOUNTER (OUTPATIENT)
Dept: GENERAL RADIOLOGY | Facility: HOSPITAL | Age: 30
Discharge: HOME | End: 2025-01-20
Admitting: NURSE PRACTITIONER
Payer: COMMERCIAL

## 2025-01-20 VITALS
HEART RATE: 70 BPM | DIASTOLIC BLOOD PRESSURE: 74 MMHG | SYSTOLIC BLOOD PRESSURE: 108 MMHG | BODY MASS INDEX: 27.13 KG/M2 | TEMPERATURE: 97.2 F | OXYGEN SATURATION: 97 % | HEIGHT: 66 IN | WEIGHT: 168.8 LBS

## 2025-01-20 DIAGNOSIS — R07.81 RIB PAIN ON LEFT SIDE: ICD-10-CM

## 2025-01-20 DIAGNOSIS — M79.641 RIGHT HAND PAIN: Primary | ICD-10-CM

## 2025-01-20 DIAGNOSIS — R52 PAIN OF LEFT SIDE OF BODY: ICD-10-CM

## 2025-01-20 DIAGNOSIS — M25.552 LEFT HIP PAIN: ICD-10-CM

## 2025-01-20 DIAGNOSIS — M79.641 RIGHT HAND PAIN: ICD-10-CM

## 2025-01-20 DIAGNOSIS — L40.9 PSORIASIS: ICD-10-CM

## 2025-01-20 DIAGNOSIS — M77.8 RIGHT HAND TENDONITIS: ICD-10-CM

## 2025-01-20 DIAGNOSIS — R07.89 CHEST WALL PAIN: ICD-10-CM

## 2025-01-20 DIAGNOSIS — N64.4 BREAST PAIN: ICD-10-CM

## 2025-01-20 DIAGNOSIS — D72.819 LEUKOPENIA, UNSPECIFIED TYPE: ICD-10-CM

## 2025-01-20 LAB
25(OH)D3 SERPL-MCNC: 38.5 NG/ML (ref 30–100)
ALBUMIN SERPL-MCNC: 3.9 G/DL (ref 3.5–5.2)
ALBUMIN/GLOB SERPL: 1.2 G/DL
ALP SERPL-CCNC: 50 U/L (ref 39–117)
ALT SERPL W P-5'-P-CCNC: 15 U/L (ref 1–33)
ANION GAP SERPL CALCULATED.3IONS-SCNC: 12 MMOL/L (ref 5–15)
AST SERPL-CCNC: 17 U/L (ref 1–32)
BASOPHILS # BLD AUTO: 0.02 10*3/MM3 (ref 0–0.2)
BASOPHILS NFR BLD AUTO: 0.4 % (ref 0–1.5)
BILIRUB SERPL-MCNC: 0.4 MG/DL (ref 0–1.2)
BUN SERPL-MCNC: 13 MG/DL (ref 6–20)
BUN/CREAT SERPL: 14.3 (ref 7–25)
CALCIUM SPEC-SCNC: 9.4 MG/DL (ref 8.6–10.5)
CHLORIDE SERPL-SCNC: 102 MMOL/L (ref 98–107)
CHROMATIN AB SERPL-ACNC: <10 IU/ML (ref 0–14)
CO2 SERPL-SCNC: 23 MMOL/L (ref 22–29)
CREAT SERPL-MCNC: 0.91 MG/DL (ref 0.57–1)
CRP SERPL-MCNC: 2.14 MG/DL (ref 0–0.5)
DEPRECATED RDW RBC AUTO: 44.7 FL (ref 37–54)
EGFRCR SERPLBLD CKD-EPI 2021: 86.7 ML/MIN/1.73
EOSINOPHIL # BLD AUTO: 0.09 10*3/MM3 (ref 0–0.4)
EOSINOPHIL NFR BLD AUTO: 1.9 % (ref 0.3–6.2)
ERYTHROCYTE [DISTWIDTH] IN BLOOD BY AUTOMATED COUNT: 12.6 % (ref 12.3–15.4)
ERYTHROCYTE [SEDIMENTATION RATE] IN BLOOD: 29 MM/HR (ref 0–20)
FOLATE SERPL-MCNC: 7.02 NG/ML (ref 4.78–24.2)
GLOBULIN UR ELPH-MCNC: 3.3 GM/DL
GLUCOSE SERPL-MCNC: 89 MG/DL (ref 65–99)
HCT VFR BLD AUTO: 41.3 % (ref 34–46.6)
HGB BLD-MCNC: 14 G/DL (ref 12–15.9)
IMM GRANULOCYTES # BLD AUTO: 0.01 10*3/MM3 (ref 0–0.05)
IMM GRANULOCYTES NFR BLD AUTO: 0.2 % (ref 0–0.5)
LYMPHOCYTES # BLD AUTO: 0.98 10*3/MM3 (ref 0.7–3.1)
LYMPHOCYTES NFR BLD AUTO: 20.3 % (ref 19.6–45.3)
MCH RBC QN AUTO: 32.8 PG (ref 26.6–33)
MCHC RBC AUTO-ENTMCNC: 33.9 G/DL (ref 31.5–35.7)
MCV RBC AUTO: 96.7 FL (ref 79–97)
MONOCYTES # BLD AUTO: 0.49 10*3/MM3 (ref 0.1–0.9)
MONOCYTES NFR BLD AUTO: 10.1 % (ref 5–12)
NEUTROPHILS NFR BLD AUTO: 3.24 10*3/MM3 (ref 1.7–7)
NEUTROPHILS NFR BLD AUTO: 67.1 % (ref 42.7–76)
PLATELET # BLD AUTO: 115 10*3/MM3 (ref 140–450)
PMV BLD AUTO: ABNORMAL FL
POTASSIUM SERPL-SCNC: 4.3 MMOL/L (ref 3.5–5.2)
PROT SERPL-MCNC: 7.2 G/DL (ref 6–8.5)
RBC # BLD AUTO: 4.27 10*6/MM3 (ref 3.77–5.28)
SODIUM SERPL-SCNC: 137 MMOL/L (ref 136–145)
URATE SERPL-MCNC: 4 MG/DL (ref 2.4–5.7)
VIT B12 BLD-MCNC: 547 PG/ML (ref 211–946)
WBC NRBC COR # BLD AUTO: 4.83 10*3/MM3 (ref 3.4–10.8)

## 2025-01-20 PROCEDURE — 99214 OFFICE O/P EST MOD 30 MIN: CPT | Performed by: NURSE PRACTITIONER

## 2025-01-20 PROCEDURE — 82607 VITAMIN B-12: CPT | Performed by: NURSE PRACTITIONER

## 2025-01-20 PROCEDURE — 86431 RHEUMATOID FACTOR QUANT: CPT | Performed by: NURSE PRACTITIONER

## 2025-01-20 PROCEDURE — 73130 X-RAY EXAM OF HAND: CPT

## 2025-01-20 PROCEDURE — 86038 ANTINUCLEAR ANTIBODIES: CPT | Performed by: NURSE PRACTITIONER

## 2025-01-20 PROCEDURE — 80053 COMPREHEN METABOLIC PANEL: CPT | Performed by: NURSE PRACTITIONER

## 2025-01-20 PROCEDURE — 85652 RBC SED RATE AUTOMATED: CPT | Performed by: NURSE PRACTITIONER

## 2025-01-20 PROCEDURE — 36415 COLL VENOUS BLD VENIPUNCTURE: CPT | Performed by: NURSE PRACTITIONER

## 2025-01-20 PROCEDURE — 71100 X-RAY EXAM RIBS UNI 2 VIEWS: CPT

## 2025-01-20 PROCEDURE — 85025 COMPLETE CBC W/AUTO DIFF WBC: CPT | Performed by: NURSE PRACTITIONER

## 2025-01-20 PROCEDURE — 82306 VITAMIN D 25 HYDROXY: CPT | Performed by: NURSE PRACTITIONER

## 2025-01-20 PROCEDURE — 73502 X-RAY EXAM HIP UNI 2-3 VIEWS: CPT

## 2025-01-20 PROCEDURE — 84550 ASSAY OF BLOOD/URIC ACID: CPT | Performed by: NURSE PRACTITIONER

## 2025-01-20 PROCEDURE — 71046 X-RAY EXAM CHEST 2 VIEWS: CPT

## 2025-01-20 PROCEDURE — 82746 ASSAY OF FOLIC ACID SERUM: CPT | Performed by: NURSE PRACTITIONER

## 2025-01-20 PROCEDURE — 86140 C-REACTIVE PROTEIN: CPT | Performed by: NURSE PRACTITIONER

## 2025-01-20 RX ORDER — DICLOFENAC SODIUM 75 MG/1
75 TABLET, DELAYED RELEASE ORAL 2 TIMES DAILY PRN
Qty: 60 TABLET | Refills: 1 | Status: SHIPPED | OUTPATIENT
Start: 2025-01-20

## 2025-01-20 RX ORDER — METHOCARBAMOL 500 MG/1
500 TABLET, FILM COATED ORAL 2 TIMES DAILY PRN
Qty: 60 TABLET | Refills: 2 | Status: SHIPPED | OUTPATIENT
Start: 2025-01-20

## 2025-01-22 LAB — ANA SER QL: NEGATIVE

## 2025-01-26 DIAGNOSIS — M25.531 RIGHT WRIST PAIN: ICD-10-CM

## 2025-01-26 DIAGNOSIS — M79.641 RIGHT HAND PAIN: Primary | ICD-10-CM

## 2025-01-26 DIAGNOSIS — R93.89 ABNORMAL X-RAY: ICD-10-CM

## 2025-01-26 NOTE — PROGRESS NOTES
Subjective   Chief Complaint   Patient presents with    Gout    Pulled muscle in back         Donald Bennett is a 30 y.o. female.    History of Present Illness  The patient presents for evaluation of right hand pain, left-sided chest pain, left hip pain, and psoriasis.    She has been experiencing pain in her right hand since 12/29/2024 or 12/30/2024, which prompted a medical consultation on 12/31/2024. The pain was initially mild but has progressively worsened, with the development of a painful lump. She sought immediate medical attention at an urgent care facility due to the severity of the pain, which she described as pulsating. An x-ray was performed to rule out fracture or arthritis, but no blood work was conducted. She was prescribed prednisone, which provided some relief, but the pain recurred after a few weeks. She reports no numbness or tingling in her fingers. She has been managing the pain with Advil and has been wrapping her hand to keep it warm. She reports no gastrointestinal upset from Advil. She has not been taking any anti-inflammatory medications. She is right-handed and requires the use of her hand for work. The pain is exacerbated by twisting movements and limited hand opening. She reports no recent injuries.    She has been experiencing muscular pain in her chest, which has recently worsened. The pain is localized to the left side, particularly around the ribs, and is severe enough to cause difficulty breathing. She also reports pain under her hip. She reports no recent falls or trauma. The pain is exacerbated by movement, preventing her from raising her arm. The pain initially improved but has since recurred. The pain has been present for approximately 3 months, with a significant exacerbation in 12/2024 that rendered her immobile. Her mother provided massage therapy, which temporarily alleviated the pain. She occasionally experiences swelling in her lymph nodes. She also reports stiffness  in her back, almost under the scapula, and difficulty walking due to hip pain. She is unsure if the pain is muscular or bone-related. The pain improved after a period of rest but has since recurred. She reports no breast discomfort but has experienced itching around the nipple. She reports no personal history of breast cancer. She reports no heart palpitations. She believes the prednisone may have helped, but the pain recurred. She has been performing stretching exercises and Pilates. She has gained approximately 10 pounds unintentionally. She has been seeing a chiropractor. She has been sleeping on her back and sides and has changed her mattress and pillows. She has been avoiding sugar. She has not had a chest x-ray. She has been performing stretching exercises and Pilates. She has gained approximately 10 pounds. She has been seeing a chiropractor. The pain is less severe when standing still but is noticeable when lying down or sitting. She reports a pulled back muscle 3 months ago, which was accompanied by wheezing. She reports no history of breast problems or breast cancer.    She has been experiencing pain in her left hip, which is exacerbated by walking.    She has a history of low white blood cell count and has previously consulted with a hematologist. She has not seen the hematologist since 10/2024. She reports a history of low platelet count. She has been feeling fatigued and tired. She has requested a recheck of her blood sugar levels. She has been feeling fatigued and tired.    She has a history of psoriasis, which has recently flared up, particularly on her scalp. She has been using creams and shampoo for her psoriasis. She has been avoiding sugar and processed foods.    FAMILY HISTORY  She does not have a family history of breast cancer that she is aware of.    MEDICATIONS  Current: prednisone, Advil    I have reviewed the following portions of the patient's history and confirmed they are accurate:  "allergies, current medications, past family history, past medical history, past social history, past surgical history, and problem list    Review of Systems  Pertinent items are noted in HPI.     Current Outpatient Medications on File Prior to Visit   Medication Sig    cetirizine (zyrTEC) 10 MG tablet Take 1 tablet by mouth Every Night. (Patient not taking: Reported on 1/20/2025)    clobetasol (TEMOVATE) 0.05 % external solution Apply  topically to the appropriate area as directed 2 (Two) Times a Day. (Patient not taking: Reported on 1/20/2025)    riFAXIMin (XIFAXAN) 550 MG tablet Take 1 tablet by mouth 3 (Three) Times a Day. (Patient not taking: Reported on 1/20/2025)    triamcinolone (KENALOG) 0.1 % cream Apply 1 Application topically to the appropriate area as directed 2 (Two) Times a Day As Needed for Rash. (Patient not taking: Reported on 1/20/2025)     No current facility-administered medications on file prior to visit.       Objective   Vitals:    01/20/25 0949   BP: 108/74   BP Location: Left arm   Patient Position: Sitting   Cuff Size: Adult   Pulse: 70   Temp: 97.2 °F (36.2 °C)   SpO2: 97%   Weight: 76.6 kg (168 lb 12.8 oz)   Height: 167.6 cm (65.98\")     Body mass index is 27.26 kg/m².    Physical Exam  Vitals reviewed.   Constitutional:       Appearance: Normal appearance. She is well-developed.   HENT:      Head: Normocephalic and atraumatic.      Nose: Nose normal.   Eyes:      General: Lids are normal.      Conjunctiva/sclera: Conjunctivae normal.      Pupils: Pupils are equal, round, and reactive to light.   Neck:      Thyroid: No thyromegaly.      Trachea: Trachea normal.   Cardiovascular:      Rate and Rhythm: Normal rate and regular rhythm.      Heart sounds: Normal heart sounds.   Pulmonary:      Effort: Pulmonary effort is normal. No respiratory distress.      Breath sounds: Normal breath sounds.   Chest:       Musculoskeletal:      Right wrist: Tenderness present.      Right hand: Tenderness " present.      Left hip: Tenderness present.   Skin:     General: Skin is warm and dry.   Neurological:      Mental Status: She is alert and oriented to person, place, and time.      GCS: GCS eye subscore is 4. GCS verbal subscore is 5. GCS motor subscore is 6.   Psychiatric:         Attention and Perception: Attention normal.         Mood and Affect: Mood normal.         Speech: Speech normal.         Behavior: Behavior normal. Behavior is cooperative.         Thought Content: Thought content normal.         Results  Imaging  X-ray of the wrist was normal.    Lab Results   Component Value Date    WBC 4.83 01/20/2025    HGB 14.0 01/20/2025    HCT 41.3 01/20/2025    MCV 96.7 01/20/2025     (L) 01/20/2025      Lab Results   Component Value Date    GLUCOSE 89 01/20/2025    BUN 13 01/20/2025    CREATININE 0.91 01/20/2025     01/20/2025    K 4.3 01/20/2025     01/20/2025    CALCIUM 9.4 01/20/2025    PROTEINTOT 7.2 01/20/2025    ALBUMIN 3.9 01/20/2025    ALT 15 01/20/2025    AST 17 01/20/2025    ALKPHOS 50 01/20/2025    BILITOT 0.4 01/20/2025    GLOB 3.3 01/20/2025    AGRATIO 1.2 01/20/2025    BCR 14.3 01/20/2025    ANIONGAP 12.0 01/20/2025    EGFR 86.7 01/20/2025      Lab Results   Component Value Date    HGBA1C 5.20 06/13/2024      Lab Results   Component Value Date    CHOL 167 02/16/2024    TRIG 35 02/16/2024    HDL 67 (H) 02/16/2024    LDL 92 02/16/2024      Lab Results   Component Value Date    TSH 1.410 02/16/2024          Assessment & Plan   Problem List Items Addressed This Visit    None  Visit Diagnoses       Right hand pain    -  Primary    Relevant Medications    diclofenac (VOLTAREN) 75 MG EC tablet    Other Relevant Orders    Ambulatory Referral to Orthopedic Surgery (Completed)    XR Hand 3+ View Right (Completed)    C-reactive Protein (Completed)    Sedimentation Rate (Completed)    Rheumatoid Factor (Completed)    LESTER With / DsDNA, RNP, Sjogrens A / B, Dunlap (Completed)    Uric Acid  (Completed)    Vitamin D,25-Hydroxy (Completed)    Vitamin B12 & Folate (Completed)    Chest wall pain        Relevant Medications    diclofenac (VOLTAREN) 75 MG EC tablet    methocarbamol (ROBAXIN) 500 MG tablet    Other Relevant Orders    XR Chest 2 View (Completed)    C-reactive Protein (Completed)    Sedimentation Rate (Completed)    Rheumatoid Factor (Completed)    LESTER With / DsDNA, RNP, Sjogrens A / B, Dunlap (Completed)    Vitamin D,25-Hydroxy (Completed)    Vitamin B12 & Folate (Completed)    Rib pain on left side        Relevant Medications    diclofenac (VOLTAREN) 75 MG EC tablet    methocarbamol (ROBAXIN) 500 MG tablet    Other Relevant Orders    XR Ribs 2 View Left (Completed)    Vitamin D,25-Hydroxy (Completed)    Vitamin B12 & Folate (Completed)    Right hand tendonitis        Relevant Medications    diclofenac (VOLTAREN) 75 MG EC tablet    Other Relevant Orders    Ambulatory Referral to Orthopedic Surgery (Completed)    XR Hand 3+ View Right (Completed)    Pain of left side of body        Relevant Medications    diclofenac (VOLTAREN) 75 MG EC tablet    Other Relevant Orders    C-reactive Protein (Completed)    Sedimentation Rate (Completed)    Rheumatoid Factor (Completed)    LESTER With / DsDNA, RNP, Sjogrens A / B, Dunlap (Completed)    Uric Acid (Completed)    Vitamin D,25-Hydroxy (Completed)    Vitamin B12 & Folate (Completed)    Leukopenia, unspecified type        Relevant Medications    diclofenac (VOLTAREN) 75 MG EC tablet    Other Relevant Orders    CBC Auto Differential (Completed)    Comprehensive Metabolic Panel (Completed)    Left hip pain        Relevant Medications    diclofenac (VOLTAREN) 75 MG EC tablet    methocarbamol (ROBAXIN) 500 MG tablet    Other Relevant Orders    Vitamin D,25-Hydroxy (Completed)    Vitamin B12 & Folate (Completed)    XR Hip With or Without Pelvis 2 - 3 View Left (Completed)    Breast pain        Relevant Orders    Mammo Diagnostic Digital Tomosynthesis Bilateral  With CAD    Psoriasis                   Current Outpatient Medications:     cetirizine (zyrTEC) 10 MG tablet, Take 1 tablet by mouth Every Night. (Patient not taking: Reported on 1/20/2025), Disp: 30 tablet, Rfl: 5    clobetasol (TEMOVATE) 0.05 % external solution, Apply  topically to the appropriate area as directed 2 (Two) Times a Day. (Patient not taking: Reported on 1/20/2025), Disp: 50 mL, Rfl: 1    diclofenac (VOLTAREN) 75 MG EC tablet, Take 1 tablet by mouth 2 (Two) Times a Day As Needed (right hand pain). Take with food., Disp: 60 tablet, Rfl: 1    methocarbamol (ROBAXIN) 500 MG tablet, Take 1 tablet by mouth 2 (Two) Times a Day As Needed for Muscle Spasms (muscule pain)., Disp: 60 tablet, Rfl: 2    riFAXIMin (XIFAXAN) 550 MG tablet, Take 1 tablet by mouth 3 (Three) Times a Day. (Patient not taking: Reported on 1/20/2025), Disp: 42 tablet, Rfl: 0    triamcinolone (KENALOG) 0.1 % cream, Apply 1 Application topically to the appropriate area as directed 2 (Two) Times a Day As Needed for Rash. (Patient not taking: Reported on 1/20/2025), Disp: 453 g, Rfl: 1    Assessment & Plan  1. Right hand pain.  The symptoms suggest a possible diagnosis of tendinitis. The x-ray results were unremarkable, and the steroid pack provided some relief. A referral to orthopedics will be made for further evaluation. An x-ray of the hand will be ordered. Prescriptions for diclofenac oral and diclofenac gel will be provided, which can be used in conjunction with Tylenol if necessary. She is advised to avoid taking Advil or Motrin. She is also advised to wear a support brace until her orthopedic appointment.    2. Left-sided chest wall pain.  The pain appears to be muscular in nature, as it is exacerbated by movement. An anti-inflammatory medication and a muscle relaxer will be prescribed. She is advised to take these medications with food. A chest x-ray and a left rib x-ray will be ordered. Blood work will be conducted to check  inflammatory markers and vitamin levels. A mammogram will also be ordered.    3. Left hip pain.  An x-ray of the left hip will be ordered.    4. Psoriasis.  The psoriasis flare-up is likely autoimmune in nature. She is advised to continue using her current creams and shampoo. The anti-inflammatory medication prescribed for her chest pain may also help with the psoriasis.    5. Low white blood cell count.  Her white blood cell count has consistently been low, and she has previously consulted with a hematologist. Blood work will be conducted to check her white blood cell count and other relevant markers.         Plan of care reviewed with the patient at the conclusion of today's visit.  Education was provided regarding diagnosis, management, and any prescribed or recommended OTC medications.  Patient verbalized understanding of and agreement with management plan.     Return if symptoms worsen or fail to improve.      Transcribed from ambient dictation for JEANNE Robles by JEANNE Robles.  01/26/25   15:48 EST    Patient or patient representative verbalized consent for the use of Ambient Listening during the visit with  JEANNE Robles for chart documentation. 1/26/2025  15:50 EST

## 2025-01-27 ENCOUNTER — OFFICE VISIT (OUTPATIENT)
Age: 30
End: 2025-01-27
Payer: COMMERCIAL

## 2025-01-27 VITALS
SYSTOLIC BLOOD PRESSURE: 100 MMHG | HEIGHT: 67 IN | DIASTOLIC BLOOD PRESSURE: 80 MMHG | WEIGHT: 170.1 LBS | BODY MASS INDEX: 26.7 KG/M2

## 2025-01-27 DIAGNOSIS — M79.641 PAIN OF RIGHT HAND: Primary | ICD-10-CM

## 2025-01-27 PROCEDURE — 99203 OFFICE O/P NEW LOW 30 MIN: CPT | Performed by: PLASTIC SURGERY

## 2025-01-27 NOTE — PROGRESS NOTES
"                                                               [facility]opedic     Office Visit       Date: 01/27/2025   Patient Name: Donald Bennett  MRN: [id number]  YOB: 1995    Referring Physician: Bandar Wharton*     Chief Complaint:   Chief Complaint   Patient presents with    Right Hand - Pain       History of Present Illness:   Donald Bennett is a 30 y.o. female right-hand-dominant presents with right hand pain and swelling of 1 month duration.  She denies inciting trauma.  She reports reports hand pain over the dorsal radial aspect of her right hand at the base of the index finger metacarpal.  Reports occasionally become swollen with a palpable mass.  Is worse with use and activity.  She has tried anti-inflammatories and compression with minimal improvement.  She works as a .  She denies smoking.  She is otherwise healthy.      Subjective   Review of Systems:   Review of Systems   All other systems reviewed and are negative.       Pertinent review of systems per HPI.     I reviewed the patient's chief complaint, history of present illness, review of systems, past medical history, surgical history, family history, social history, medications and allergy list in the EMR on 01/27/2025 and agree with the findings above.    Objective    Vital Signs:   Vitals:    01/27/25 0746   BP: 100/80   Weight: 77.2 kg (170 lb 1.6 oz)   Height: 170.2 cm (67\")     BMI: Body mass index is 26.64 kg/m².    General Appearance: No acute distress. Alert and oriented.     Chest:  Non-labored breathing on room air. Regular rate and rhythm.    Upper Extremity Exam:    Tender to palpation over the index finger metacarpal base with mild localized swelling.  No obvious well-circumscribed mass.  Nontender over the radial styloid.  Negative Finkelstein's test.  Nontender over the thumb CMC.  Negative CMC shuck test    Fingers are warm, well-perfused with appropriate capillary " refill.  Palpable radial pulse.    Sensation intact to light touch in median, radial and ulnar nerve distributions.    Motor- Fires [employer], ulnar intrinsics, EPL/EDC w/ full active and passive range of motion. Strength intact.    Non-tender except for in the areas highlighted    Imaging/Studies:   Imaging Results (Last 24 Hours)       ** No results found for the last 24 hours. **            X-ray of the right hand from 1/20/2025 is independently reviewed and interpreted myself and demonstrates no evidence of bony abnormality.    Procedures:  Procedures    Quality Measures:   ACP:   ACP discussion was deferred.    Tobacco:   Donald Bennett  reports that she has never smoked. She has never used smokeless tobacco.      Assessment / Plan    Assessment/Plan:     There are no diagnoses linked to this encounter.     Donald Rodriguezs a 30 y.o. female who presents with:      ICD-10-CM ICD-9-CM   1. Pain of right hand  M79.641 729.5         Presents with right dorsal radial hand pain and swelling of atraumatic nature.  No evidence of bony abnormality on x-ray.  Differential includes occult ganglion cyst, second extensor compartment tendinitis versus second CMC joint inflammation.  No evidence of occult ganglion cyst on ultrasound today, make me highly suspicious of second extensor tendinitis.  Discussed treatment options including anti-inflammatories, bracing, and corticosteroid injection.  Patient like to trial anti-inflammatories and bracing before proceeding with possible corticosteroid injection.  Recommend right wrist cock up brace.  Recommend follow-up in 1 month for repeat exam.    Follow Up:   No follow-ups on file.        Ridge Calderon MD  [facility] [place] Hand and Upper Extremity Surgeon

## 2025-01-29 ENCOUNTER — HOSPITAL ENCOUNTER (OUTPATIENT)
Facility: HOSPITAL | Age: 30
Discharge: HOME | End: 2025-01-29
Admitting: NURSE PRACTITIONER
Payer: COMMERCIAL

## 2025-01-29 DIAGNOSIS — N64.4 BREAST PAIN: ICD-10-CM

## 2025-01-29 PROCEDURE — 77062 BREAST TOMOSYNTHESIS BI: CPT | Performed by: RADIOLOGY

## 2025-01-29 PROCEDURE — G0279 TOMOSYNTHESIS, MAMMO: HCPCS

## 2025-01-29 PROCEDURE — 77066 DX MAMMO INCL CAD BI: CPT

## 2025-01-29 PROCEDURE — 77066 DX MAMMO INCL CAD BI: CPT | Performed by: RADIOLOGY

## 2025-02-27 ENCOUNTER — OFFICE VISIT (OUTPATIENT)
Age: 30
End: 2025-02-27
Payer: COMMERCIAL

## 2025-02-27 ENCOUNTER — OFFICE VISIT (OUTPATIENT)
Dept: INTERNAL MEDICINE | Facility: CLINIC | Age: 30
End: 2025-02-27
Payer: COMMERCIAL

## 2025-02-27 VITALS
DIASTOLIC BLOOD PRESSURE: 64 MMHG | OXYGEN SATURATION: 100 % | HEART RATE: 67 BPM | BODY MASS INDEX: 25.9 KG/M2 | WEIGHT: 165 LBS | TEMPERATURE: 97.8 F | HEIGHT: 67 IN | SYSTOLIC BLOOD PRESSURE: 110 MMHG

## 2025-02-27 VITALS
DIASTOLIC BLOOD PRESSURE: 55 MMHG | SYSTOLIC BLOOD PRESSURE: 100 MMHG | HEIGHT: 67 IN | WEIGHT: 163.16 LBS | BODY MASS INDEX: 25.61 KG/M2

## 2025-02-27 DIAGNOSIS — R52 PAIN OF LEFT SIDE OF BODY: Primary | ICD-10-CM

## 2025-02-27 DIAGNOSIS — R79.82 ELEVATED C-REACTIVE PROTEIN (CRP): ICD-10-CM

## 2025-02-27 DIAGNOSIS — R07.89 CHEST WALL PAIN: ICD-10-CM

## 2025-02-27 DIAGNOSIS — M77.8 RIGHT HAND TENDONITIS: ICD-10-CM

## 2025-02-27 DIAGNOSIS — R07.81 RIB PAIN ON LEFT SIDE: ICD-10-CM

## 2025-02-27 DIAGNOSIS — M25.50 GENERALIZED JOINT PAIN: ICD-10-CM

## 2025-02-27 DIAGNOSIS — R52 BODY ACHES: ICD-10-CM

## 2025-02-27 DIAGNOSIS — R70.0 ELEVATED SED RATE: ICD-10-CM

## 2025-02-27 DIAGNOSIS — M79.641 PAIN OF RIGHT HAND: Primary | ICD-10-CM

## 2025-02-27 DIAGNOSIS — M79.641 RIGHT HAND PAIN: ICD-10-CM

## 2025-02-27 DIAGNOSIS — M25.552 LEFT HIP PAIN: ICD-10-CM

## 2025-02-27 RX ORDER — DICLOFENAC SODIUM 75 MG/1
75 TABLET, DELAYED RELEASE ORAL 2 TIMES DAILY PRN
Qty: 60 TABLET | Refills: 1 | Status: SHIPPED | OUTPATIENT
Start: 2025-02-27

## 2025-02-27 NOTE — PROGRESS NOTES
[facility]     Follow-up Office Visit       Date: 02/27/2025   Patient Name: Donald Bennett  MRN: [id number]  YOB: 1995    Chief Complaint:   Chief Complaint   Patient presents with    Follow-up     4.5 week recheck - Pain of right hand            History of Present Illness:   Donald Bennett is a 30 y.o. female for follow-up right hand and wrist pain.  Patient reports her symptoms are worsened since her last visit.  Reports pain is a 5 out of 10.  Reports pain over the dorsal radial aspect of the right wrist and hand is worse with use and activity.  Pain is worse at night after a long day at work.  She has been taking diclofenac with some improvement in her pain.      Subjective   Review of Systems:   Review of Systems   Constitutional:  Negative for chills, fever, unexpected weight gain and unexpected weight loss.   HENT:  Negative for congestion, postnasal drip and rhinorrhea.    Eyes:  Negative for blurred vision.   Respiratory:  Negative for shortness of breath.    Cardiovascular:  Negative for leg swelling.   Gastrointestinal:  Negative for abdominal pain, nausea and vomiting.   Genitourinary:  Negative for difficulty urinating.   Musculoskeletal:  Positive for arthralgias. Negative for gait problem, joint swelling and myalgias.   Skin:  Negative for skin lesions and wound.   Neurological:  Negative for dizziness, weakness, light-headedness and numbness.   Hematological:  Does not bruise/bleed easily.   Psychiatric/Behavioral:  Negative for depressed mood.    All other systems reviewed and are negative.       Pertinent review of systems per HPI    I reviewed the patient's chief complaint, history of present illness, review of systems, past medical history, surgical history, family history, social history, medications and allergy list in the EMR on 02/27/2025 and agree with the findings above.    Objective   "  Vital Signs:   Vitals:    02/27/25 0800   BP: 100/55   Weight: 74 kg (163 lb 2.6 oz)   Height: 170.2 cm (67.01\")     BMI: Body mass index is 25.55 kg/m².     General Appearance: No acute distress. Alert and oriented.     Chest:  Non-labored breathing on room air      Ortho Exam:  Palpation over the dorsal radial wrist at the base of the index finger metacarpal.  Increased pain with resisted extension of the wrist.  Nontender over the radial styloid.  Negative Finkelstein's test.  Nontender to the fourth extensor compartment.  Nontender over the dorsal SL.  Fingers warm and well-perfused distally  Sensation intact to light touch in the median, radial and ulnar nerve distributions    Imaging/Studies:   Imaging Results (Last 24 Hours)       ** No results found for the last 24 hours. **              Procedures:  Procedures    Quality Measures:   ACP:   ACP discussion was deferred.    Tobacco:   Donald Bennett  reports that she has never smoked. She has never used smokeless tobacco.    Assessment / Plan    Assessment/Plan:      Diagnosis Plan   1. Pain of right hand  MRI Wrist Right Without Contrast          Patient presents with right hand and wrist pain.  She has had persistent pain over her index finger metacarpal base despite anti-inflammatories and bracing.  Differential includes occult ganglion cyst versus second extensor compartment tendinitis.  Recommend MRI of the right wrist for evaluation.  Recommend follow-up after MRI to discuss results.    Follow Up:   Return for Follow-up after Imaging.        Ridge Calderon MD  [facility] [place] [facility] Upper Extremity Surgeon  "

## 2025-04-15 ENCOUNTER — OFFICE VISIT (OUTPATIENT)
Dept: INTERNAL MEDICINE | Facility: CLINIC | Age: 30
End: 2025-04-15
Payer: COMMERCIAL

## 2025-04-15 VITALS
HEIGHT: 67 IN | WEIGHT: 164.4 LBS | BODY MASS INDEX: 25.8 KG/M2 | SYSTOLIC BLOOD PRESSURE: 118 MMHG | DIASTOLIC BLOOD PRESSURE: 72 MMHG

## 2025-04-15 DIAGNOSIS — L40.9 PSORIASIS: Primary | ICD-10-CM

## 2025-04-15 DIAGNOSIS — M77.8 RIGHT HAND TENDONITIS: ICD-10-CM

## 2025-04-15 DIAGNOSIS — R52 PAIN OF LEFT SIDE OF BODY: ICD-10-CM

## 2025-04-15 DIAGNOSIS — M25.50 GENERALIZED JOINT PAIN: ICD-10-CM

## 2025-04-15 RX ORDER — PREDNISOLONE 15 MG/5ML
20 SOLUTION ORAL
Qty: 33.35 ML | Refills: 0 | Status: SHIPPED | OUTPATIENT
Start: 2025-04-15 | End: 2025-04-20

## 2025-04-15 NOTE — PROGRESS NOTES
"Chief Complaint   Patient presents with    Hand Pain    Generalized Body Aches    Rash       HPI  Donald Bennett is a 30 y.o. female presents with a rash and pain in multiple joints.  Her current treatment includes Diclofenac. She states the medication helps.  She states \"everything\" hurts.  Last Thursday she noticed a rash on her extremities.  She also complains of a dry rash in her scalp as well.  All this has been going on for a while.  She has a rheumatology appt in July.    The following portions of the patient's history were reviewed and updated as appropriate: allergies, current medications, past family history, past medical history, past social history, past surgical history, and problem list.    Subjective  Review of Systems   Musculoskeletal:  Positive for arthralgias. Negative for joint swelling.   Skin:  Positive for dry skin and rash.       Objective  Visit Vitals  /72   Ht 170.2 cm (67.01\")   Wt 74.6 kg (164 lb 6.4 oz)   BMI 25.74 kg/m²        Physical Exam  Vitals and nursing note reviewed.   HENT:      Head: Normocephalic.   Eyes:      Pupils: Pupils are equal, round, and reactive to light.   Cardiovascular:      Rate and Rhythm: Normal rate and regular rhythm.      Pulses: Normal pulses.      Heart sounds: Normal heart sounds.   Pulmonary:      Effort: Pulmonary effort is normal. No respiratory distress.      Breath sounds: Normal breath sounds.   Musculoskeletal:      Comments: Full ROM of major joints   Skin:     General: Skin is warm and dry.      Capillary Refill: Capillary refill takes less than 2 seconds.      Comments: Psoriasis noted on scalp and various parts of body   Neurological:      General: No focal deficit present.      Mental Status: She is alert and oriented to person, place, and time.      Gait: Gait is intact.   Psychiatric:         Attention and Perception: Attention normal.         Mood and Affect: Mood normal.         Behavior: Behavior normal.          Procedures "     Assessment and Plan  Diagnoses and all orders for this visit:    1. Psoriasis (Primary)  -     prednisoLONE (PRELONE) 15 MG/5ML solution oral solution; Take 6.67 mL by mouth Daily With Breakfast for 5 days.  Dispense: 33.35 mL; Refill: 0    2. Pain of left side of body  -     prednisoLONE (PRELONE) 15 MG/5ML solution oral solution; Take 6.67 mL by mouth Daily With Breakfast for 5 days.  Dispense: 33.35 mL; Refill: 0    3. Right hand tendonitis    4. Generalized joint pain  -     prednisoLONE (PRELONE) 15 MG/5ML solution oral solution; Take 6.67 mL by mouth Daily With Breakfast for 5 days.  Dispense: 33.35 mL; Refill: 0    Recommend she keep appt with rheum in July  Likely has psoriatic arthritis  Start steroid chang, liquid per pt request  Recommend rest for tendonitis in wrist/hand, unable to wear brace    Return in about 3 months (around 7/15/2025) for psoriaasis, joint pain.      Henry Richards APRN

## 2025-05-25 ENCOUNTER — APPOINTMENT (OUTPATIENT)
Facility: HOSPITAL | Age: 30
End: 2025-05-25
Payer: COMMERCIAL

## 2025-05-25 ENCOUNTER — HOSPITAL ENCOUNTER (EMERGENCY)
Facility: HOSPITAL | Age: 30
Discharge: HOME | End: 2025-05-26
Attending: EMERGENCY MEDICINE | Admitting: EMERGENCY MEDICINE
Payer: COMMERCIAL

## 2025-05-25 DIAGNOSIS — D70.9 NEUTROPENIA, UNSPECIFIED TYPE: Primary | ICD-10-CM

## 2025-05-25 LAB
ALBUMIN SERPL-MCNC: 3.8 G/DL (ref 3.5–5.2)
ALBUMIN/GLOB SERPL: 1.1 G/DL
ALP SERPL-CCNC: 71 U/L (ref 39–117)
ALT SERPL W P-5'-P-CCNC: 20 U/L (ref 1–33)
ANION GAP SERPL CALCULATED.3IONS-SCNC: 12.3 MMOL/L (ref 5–15)
AST SERPL-CCNC: 25 U/L (ref 1–32)
B PARAPERT DNA SPEC QL NAA+PROBE: NOT DETECTED
B PERT DNA SPEC QL NAA+PROBE: NOT DETECTED
BILIRUB SERPL-MCNC: 0.3 MG/DL (ref 0–1.2)
BUN SERPL-MCNC: 8 MG/DL (ref 6–20)
BUN/CREAT SERPL: 10.8 (ref 7–25)
C PNEUM DNA NPH QL NAA+NON-PROBE: NOT DETECTED
CALCIUM SPEC-SCNC: 9.3 MG/DL (ref 8.6–10.5)
CHLORIDE SERPL-SCNC: 102 MMOL/L (ref 98–107)
CO2 SERPL-SCNC: 21.7 MMOL/L (ref 22–29)
CREAT SERPL-MCNC: 0.74 MG/DL (ref 0.57–1)
DEPRECATED RDW RBC AUTO: 40 FL (ref 37–54)
EGFRCR SERPLBLD CKD-EPI 2021: 111.1 ML/MIN/1.73
EOSINOPHIL # BLD MANUAL: 0.02 10*3/MM3 (ref 0–0.4)
EOSINOPHIL NFR BLD MANUAL: 1 % (ref 0.3–6.2)
ERYTHROCYTE [DISTWIDTH] IN BLOOD BY AUTOMATED COUNT: 12.4 % (ref 12.3–15.4)
FLUAV SUBTYP SPEC NAA+PROBE: NOT DETECTED
FLUBV RNA ISLT QL NAA+PROBE: NOT DETECTED
GLOBULIN UR ELPH-MCNC: 3.4 GM/DL
GLUCOSE SERPL-MCNC: 91 MG/DL (ref 65–99)
HADV DNA SPEC NAA+PROBE: NOT DETECTED
HCOV 229E RNA SPEC QL NAA+PROBE: NOT DETECTED
HCOV HKU1 RNA SPEC QL NAA+PROBE: NOT DETECTED
HCOV NL63 RNA SPEC QL NAA+PROBE: NOT DETECTED
HCOV OC43 RNA SPEC QL NAA+PROBE: NOT DETECTED
HCT VFR BLD AUTO: 35.7 % (ref 34–46.6)
HGB BLD-MCNC: 12 G/DL (ref 12–15.9)
HMPV RNA NPH QL NAA+NON-PROBE: NOT DETECTED
HPIV1 RNA ISLT QL NAA+PROBE: NOT DETECTED
HPIV2 RNA SPEC QL NAA+PROBE: NOT DETECTED
HPIV3 RNA NPH QL NAA+PROBE: NOT DETECTED
HPIV4 P GENE NPH QL NAA+PROBE: NOT DETECTED
LARGE PLATELETS: ABNORMAL
LYMPHOCYTES # BLD MANUAL: 0.7 10*3/MM3 (ref 0.7–3.1)
LYMPHOCYTES NFR BLD MANUAL: 15 % (ref 5–12)
M PNEUMO IGG SER IA-ACNC: NOT DETECTED
MCH RBC QN AUTO: 29.6 PG (ref 26.6–33)
MCHC RBC AUTO-ENTMCNC: 33.6 G/DL (ref 31.5–35.7)
MCV RBC AUTO: 87.9 FL (ref 79–97)
METAMYELOCYTES NFR BLD MANUAL: 1 % (ref 0–0)
MONOCYTES # BLD: 0.29 10*3/MM3 (ref 0.1–0.9)
MYELOCYTES NFR BLD MANUAL: 1 % (ref 0–0)
NEUTROPHILS # BLD AUTO: 0.9 10*3/MM3 (ref 1.7–7)
NEUTROPHILS NFR BLD MANUAL: 38 % (ref 42.7–76)
NEUTS BAND NFR BLD MANUAL: 8 % (ref 0–5)
PLATELET # BLD AUTO: 107 10*3/MM3 (ref 140–450)
PMV BLD AUTO: 14.2 FL (ref 6–12)
POTASSIUM SERPL-SCNC: 3.6 MMOL/L (ref 3.5–5.2)
PROT SERPL-MCNC: 7.2 G/DL (ref 6–8.5)
RBC # BLD AUTO: 4.06 10*6/MM3 (ref 3.77–5.28)
RBC MORPH BLD: NORMAL
RHINOVIRUS RNA SPEC NAA+PROBE: NOT DETECTED
RSV RNA NPH QL NAA+NON-PROBE: NOT DETECTED
SARS-COV-2 RNA RESP QL NAA+PROBE: NOT DETECTED
SMALL PLATELETS BLD QL SMEAR: ABNORMAL
SODIUM SERPL-SCNC: 136 MMOL/L (ref 136–145)
VARIANT LYMPHS NFR BLD MANUAL: 2 % (ref 0–5)
VARIANT LYMPHS NFR BLD MANUAL: 34 % (ref 19.6–45.3)
WBC MORPH BLD: NORMAL
WBC NRBC COR # BLD AUTO: 1.95 10*3/MM3 (ref 3.4–10.8)

## 2025-05-25 PROCEDURE — 84550 ASSAY OF BLOOD/URIC ACID: CPT | Performed by: PHYSICIAN ASSISTANT

## 2025-05-25 PROCEDURE — 85007 BL SMEAR W/DIFF WBC COUNT: CPT | Performed by: PHYSICIAN ASSISTANT

## 2025-05-25 PROCEDURE — 85025 COMPLETE CBC W/AUTO DIFF WBC: CPT | Performed by: PHYSICIAN ASSISTANT

## 2025-05-25 PROCEDURE — 0202U NFCT DS 22 TRGT SARS-COV-2: CPT | Performed by: EMERGENCY MEDICINE

## 2025-05-25 PROCEDURE — 83615 LACTATE (LD) (LDH) ENZYME: CPT | Performed by: PHYSICIAN ASSISTANT

## 2025-05-25 PROCEDURE — 36415 COLL VENOUS BLD VENIPUNCTURE: CPT

## 2025-05-25 PROCEDURE — 84145 PROCALCITONIN (PCT): CPT | Performed by: PHYSICIAN ASSISTANT

## 2025-05-25 PROCEDURE — 80053 COMPREHEN METABOLIC PANEL: CPT | Performed by: PHYSICIAN ASSISTANT

## 2025-05-25 PROCEDURE — 25810000003 SODIUM CHLORIDE 0.9 % SOLUTION: Performed by: PHYSICIAN ASSISTANT

## 2025-05-25 PROCEDURE — 71045 X-RAY EXAM CHEST 1 VIEW: CPT

## 2025-05-25 PROCEDURE — 99283 EMERGENCY DEPT VISIT LOW MDM: CPT | Performed by: EMERGENCY MEDICINE

## 2025-05-25 PROCEDURE — 85060 BLOOD SMEAR INTERPRETATION: CPT | Performed by: PHYSICIAN ASSISTANT

## 2025-05-25 RX ORDER — SODIUM CHLORIDE 0.9 % (FLUSH) 0.9 %
10 SYRINGE (ML) INJECTION AS NEEDED
Status: DISCONTINUED | OUTPATIENT
Start: 2025-05-25 | End: 2025-05-26 | Stop reason: HOSPADM

## 2025-05-25 RX ORDER — SULFASALAZINE 500 MG/1
500 TABLET ORAL 3 TIMES DAILY
COMMUNITY
End: 2025-05-26 | Stop reason: SINTOL

## 2025-05-25 RX ADMIN — SODIUM CHLORIDE 1000 ML: 9 INJECTION, SOLUTION INTRAVENOUS at 23:02

## 2025-05-26 VITALS
HEIGHT: 66 IN | WEIGHT: 164 LBS | SYSTOLIC BLOOD PRESSURE: 107 MMHG | TEMPERATURE: 98.3 F | BODY MASS INDEX: 26.36 KG/M2 | DIASTOLIC BLOOD PRESSURE: 82 MMHG | OXYGEN SATURATION: 100 % | RESPIRATION RATE: 18 BRPM | HEART RATE: 62 BPM

## 2025-05-26 LAB
BILIRUB UR QL STRIP: NEGATIVE
CLARITY UR: CLEAR
COLOR UR: YELLOW
D-LACTATE SERPL-SCNC: 0.6 MMOL/L (ref 0.5–2)
GLUCOSE UR STRIP-MCNC: NEGATIVE MG/DL
HGB UR QL STRIP.AUTO: NEGATIVE
KETONES UR QL STRIP: NEGATIVE
LDH SERPL-CCNC: 204 U/L (ref 135–214)
LEUKOCYTE ESTERASE UR QL STRIP.AUTO: NEGATIVE
NITRITE UR QL STRIP: NEGATIVE
PH UR STRIP.AUTO: 6.5 [PH] (ref 5–8)
PROCALCITONIN SERPL-MCNC: 0.07 NG/ML (ref 0–0.25)
PROT UR QL STRIP: NEGATIVE
SP GR UR STRIP: <=1.005 (ref 1–1.03)
URATE SERPL-MCNC: 3.9 MG/DL (ref 2.4–5.7)
UROBILINOGEN UR QL STRIP: NORMAL

## 2025-05-26 PROCEDURE — 36415 COLL VENOUS BLD VENIPUNCTURE: CPT

## 2025-05-26 PROCEDURE — 87040 BLOOD CULTURE FOR BACTERIA: CPT | Performed by: PHYSICIAN ASSISTANT

## 2025-05-26 PROCEDURE — 25810000003 SODIUM CHLORIDE 0.9 % SOLUTION: Performed by: PHYSICIAN ASSISTANT

## 2025-05-26 PROCEDURE — 83605 ASSAY OF LACTIC ACID: CPT | Performed by: PHYSICIAN ASSISTANT

## 2025-05-26 PROCEDURE — 81003 URINALYSIS AUTO W/O SCOPE: CPT | Performed by: PHYSICIAN ASSISTANT

## 2025-05-26 RX ADMIN — SODIUM CHLORIDE 1000 ML: 9 INJECTION, SOLUTION INTRAVENOUS at 00:39

## 2025-05-26 NOTE — FSED PROVIDER NOTE
"Subjective  History of Present Illness:    30-year-old PMH anemia, PCOS, psoriasis presents to ED for evaluation of multiple complaints.  Patient states that last week she noticed some pain to her right axilla.  States that she has a swollen lymph node to the area.  She states that she has been feeling very fatigued and diffuse bodyaches.  Reporting subjective fevers although she has not actually measured her temperature she does not have a thermometer at home.  She reports cough.    Patient currently being evaluated with rheumatology for suspected psoriatic arthritis.  She is taking sulfasalazine and diclofenac.      Nurses Notes reviewed and agree, including vitals, allergies, social history and prior medical history.     REVIEW OF SYSTEMS: All systems reviewed and not pertinent unless noted.  Review of Systems   Constitutional:  Positive for fatigue.        Lymphadenopathy   Respiratory:  Positive for cough.    Musculoskeletal:  Positive for myalgias.   All other systems reviewed and are negative.      Past Medical History:   Diagnosis Date    Anemia     PCOS (polycystic ovarian syndrome)     Psoriasis        Allergies:    Patient has no known allergies.      Past Surgical History:   Procedure Laterality Date    ANKLE OPEN REDUCTION INTERNAL FIXATION Right 2017         Social History     Socioeconomic History    Marital status: Single   Tobacco Use    Smoking status: Never    Smokeless tobacco: Never   Vaping Use    Vaping status: Never Used   Substance and Sexual Activity    Alcohol use: Never    Drug use: Never    Sexual activity: Never         Family History   Problem Relation Age of Onset    Hypertension Mother        Objective  Physical Exam:  /82 (BP Location: Left arm, Patient Position: Lying)   Pulse 62   Temp 98.3 °F (36.8 °C) (Oral)   Resp 18   Ht 167.6 cm (66\")   Wt 74.4 kg (164 lb)   LMP 04/01/2025 (Approximate)   SpO2 100%   BMI 26.47 kg/m²      Physical Exam  Vitals and nursing note " reviewed.   Constitutional:       General: She is not in acute distress.  HENT:      Head: Normocephalic and atraumatic.      Right Ear: Tympanic membrane, ear canal and external ear normal.      Left Ear: Tympanic membrane, ear canal and external ear normal.      Nose: Nose normal.      Mouth/Throat:      Pharynx: Oropharynx is clear. No oropharyngeal exudate or posterior oropharyngeal erythema.   Cardiovascular:      Rate and Rhythm: Normal rate and regular rhythm.   Pulmonary:      Effort: Pulmonary effort is normal. No respiratory distress.      Breath sounds: Normal breath sounds.   Abdominal:      Palpations: Abdomen is soft.      Tenderness: There is no abdominal tenderness. There is no guarding or rebound.   Lymphadenopathy:      Upper Body:      Right upper body: Axillary adenopathy present.   Skin:     General: Skin is warm and dry.   Neurological:      Mental Status: She is alert.      Comments: Awake and alert   Psychiatric:         Mood and Affect: Mood normal.         Behavior: Behavior normal.         Procedures    ED Course:         Lab Results (last 24 hours)       Procedure Component Value Units Date/Time    Respiratory Panel PCR w/COVID-19(SARS-CoV-2) [facility] In-House, NP Swab in UTM/VTM, 2 HR TAT - Swab, Nasopharynx [[id number]]  (Normal) Collected: 05/25/25 2132    Specimen: Swab from Nasopharynx Updated: 05/25/25 2228     ADENOVIRUS, PCR Not Detected     Coronavirus 229E Not Detected     Coronavirus HKU1 Not Detected     Coronavirus NL63 Not Detected     Coronavirus OC43 Not Detected     COVID19 Not Detected     Human Metapneumovirus Not Detected     Human Rhinovirus/Enterovirus Not Detected     Influenza A PCR Not Detected     Influenza B PCR Not Detected     Parainfluenza Virus 1 Not Detected     Parainfluenza Virus 2 Not Detected     Parainfluenza Virus 3 Not Detected     Parainfluenza Virus 4 Not Detected     RSV, PCR Not Detected     Bordetella pertussis pcr Not Detected      Bordetella parapertussis PCR Not Detected     Chlamydophila pneumoniae PCR Not Detected     Mycoplasma pneumo by PCR Not Detected    Narrative:      In the setting of a positive respiratory panel with a viral infection PLUS a negative procalcitonin without other underlying concern for bacterial infection, consider observing off antibiotics or discontinuation of antibiotics and continue supportive care. If the respiratory panel is positive for atypical bacterial infection (Bordetella pertussis, Chlamydophila pneumoniae, or Mycoplasma pneumoniae), consider antibiotic de-escalation to target atypical bacterial infection.    CBC & Differential [[id number]]  (Abnormal) Collected: 05/25/25 2258    Specimen: Blood Updated: 05/25/25 2338    Narrative:      The following orders were created for panel order CBC & Differential.  Procedure                               Abnormality         Status                     ---------                               -----------         ------                     CBC Auto Differential[[id number]]        Abnormal            Final result               Scan Slide[[id number]]                                                                    Please view results for these tests on the individual orders.    Comprehensive Metabolic Panel [[id number]]  (Abnormal) Collected: 05/25/25 2258    Specimen: Blood Updated: 05/25/25 2319     Glucose 91 mg/dL      BUN 8 mg/dL      Creatinine 0.74 mg/dL      Sodium 136 mmol/L      Potassium 3.6 mmol/L      Chloride 102 mmol/L      CO2 21.7 mmol/L      Calcium 9.3 mg/dL      Total Protein 7.2 g/dL      Albumin 3.8 g/dL      ALT (SGPT) 20 U/L      AST (SGOT) 25 U/L      Alkaline Phosphatase 71 U/L      Total Bilirubin 0.3 mg/dL      Globulin 3.4 gm/dL      A/G Ratio 1.1 g/dL      BUN/Creatinine Ratio 10.8     Anion Gap 12.3 mmol/L      eGFR 111.1 mL/min/1.73     Narrative:      GFR Categories in Chronic Kidney Disease (CKD)              GFR Category          GFR  (mL/min/1.73)    Interpretation  G1                    90 or greater        Normal or high (1)  G2                    60-89                Mild decrease (1)  G3a                   45-59                Mild to moderate decrease  G3b                   30-44                Moderate to severe decrease  G4                    15-29                Severe decrease  G5                    14 or less           Kidney failure    (1)In the absence of evidence of kidney disease, neither GFR category G1 or G2 fulfill the criteria for CKD.    eGFR calculation 2021 CKD-EPI creatinine equation, which does not include race as a factor    CBC Auto Differential [[id number]]  (Abnormal) Collected: 05/25/25 2258    Specimen: Blood Updated: 05/25/25 2338     WBC 1.95 10*3/mm3      RBC 4.06 10*6/mm3      Hemoglobin 12.0 g/dL      Hematocrit 35.7 %      MCV 87.9 fL      MCH 29.6 pg      MCHC 33.6 g/dL      RDW 12.4 %      RDW-SD 40.0 fl      MPV 14.2 fL      Platelets 107 10*3/mm3     Manual Differential [[id number]]  (Abnormal) Collected: 05/25/25 2258    Specimen: Blood Updated: 05/25/25 2338     Neutrophil % 38.0 %      Lymphocyte % 34.0 %      Monocyte % 15.0 %      Eosinophil % 1.0 %      Bands %  8.0 %      Metamyelocyte % 1.0 %      Myelocyte % 1.0 %      Atypical Lymphocyte % 2.0 %      Neutrophils Absolute 0.90 10*3/mm3      Lymphocytes Absolute 0.70 10*3/mm3      Monocytes Absolute 0.29 10*3/mm3      Eosinophils Absolute 0.02 10*3/mm3      RBC Morphology Normal     WBC Morphology Normal     Platelet Estimate Decreased     Large Platelets Slight/1+    Slide Review, Hematology [[id number]] Collected: 05/25/25 2258    Specimen: Blood Updated: 05/25/25 2338    Uric Acid [[id number]]  (Normal) Collected: 05/25/25 2258    Specimen: Blood Updated: 05/26/25 0003     Uric Acid 3.9 mg/dL     Lactate Dehydrogenase [[id number]] Collected: 05/25/25 2258    Specimen: Blood Updated: 05/25/25 2358    Procalcitonin [[id number]]  (Normal) Collected:  05/25/25 2258    Specimen: Blood Updated: 05/26/25 0038     Procalcitonin 0.07 ng/mL     Lactic Acid, Plasma [[id number]]  (Normal) Collected: 05/26/25 0036    Specimen: Blood from Arm, Left Updated: 05/26/25 0059     Lactate 0.6 mmol/L     Urinalysis With Culture If Indicated - Urine, Clean Catch [[id number]]  (Normal) Collected: 05/26/25 0115    Specimen: Urine, Clean Catch Updated: 05/26/25 0119     Color, UA Yellow     Appearance, UA Clear     pH, UA 6.5     Specific Gravity, UA <=1.005     Glucose, UA Negative     Ketones, UA Negative     Bilirubin, UA Negative     Blood, UA Negative     Protein, UA Negative     Leuk Esterase, UA Negative     Nitrite, UA Negative     Urobilinogen, UA 0.2 E.U./dL    Narrative:      In absence of clinical symptoms, the presence of pyuria, bacteria, and/or nitrites on the urinalysis result does not correlate with infection.  Urine microscopic not indicated.             XR Chest 1 View  Result Date: 5/25/2025  XR CHEST 1 VW Date of Exam: 5/25/2025 10:58 PM EDT Indication: cough Comparison: 1/20/2025. Findings: There are no airspace consolidations. No pleural fluid. No pneumothorax. The pulmonary vasculature appears within normal limits. The cardiac and mediastinal silhouette appear unremarkable. No acute osseous abnormality identified.     Impression: Impression: No acute cardiopulmonary process. Electronically Signed: Radha Mc MD  5/25/2025 11:09 PM EDT  Workstation ID: [id number]         [facility]     Amount and/or Complexity of Data Reviewed  Clinical lab tests: reviewed  Tests in the radiology section of CPT®: reviewed        Interventions: Medications administered as below    Medications   sodium chloride 0.9 % flush 10 mL (has no administration in time range)   sodium chloride 0.9 % bolus 1,000 mL (0 mL Intravenous Stopped 5/26/25 0000)   sodium chloride 0.9 % bolus 1,000 mL (0 mL Intravenous Stopped 5/26/25 0127)       Presents the emergency department with lymphadenopathy,  neutropenia, and subjective fever no fever in the emergency department.  Patient's septic workup was negative.  I advised the patient importance of taking her temperature with a thermometer if she feels feverish.  I advised the patient that I felt the risks of sulfasalazine outweighed the benefits at this point and that she should discontinue the medicine until discussing with her doctors.  Advised the patient importance of follow-up with hematology oncology.  Patient was previously advised that this neutropenia may be the result of cyclic neutropenia; however, I feel that she needs to follow-up with them urgently and I expressed this to the patient. The patient was given anticipatory guidance and return precautions and advised of the need for urgent follow up.   -----  ED Disposition       ED Disposition   Discharge    Condition   Stable    Comment   --             Final diagnoses:   Neutropenia, unspecified type      Your Follow-Up Providers       Megan Wharton, JEANNE.    Specialties: Nurse Practitioner, Family Medicine  [address]  [address]  [place] [address]  [phone]               Go to  [facility] EMERGENCY DEPARTMENT [place].    Specialty: Emergency Medicine  Follow up details: If symptoms worsen  [address]  [place] [id number] [phone]             Yosvany Caruso MD In 3 days.    Specialties: Hematology, Oncology, Hematology and Oncology  [address]  [address]  [facility] [id number] [phone]                       Contact information for after-discharge care    Follow-up information has not been specified.                    Your medication list        CONTINUE taking these medications        Instructions Last Dose Given Next Dose Due   cetirizine 10 MG tablet  Commonly known as: zyrTEC      Take 1 tablet by mouth Every Night.       clobetasol 0.05 % external solution  Commonly known as: TEMOVATE      Apply  topically  to the appropriate area as directed 2 (Two) Times a Day.       diclofenac 75 MG EC tablet  Commonly known as: VOLTAREN      Take 1 tablet by mouth 2 (Two) Times a Day As Needed (right hand pain). Take with food.       methocarbamol 500 MG tablet  Commonly known as: ROBAXIN      Take 1 tablet by mouth 2 (Two) Times a Day As Needed for Muscle Spasms (muscule pain).       triamcinolone 0.1 % cream  Commonly known as: KENALOG      Apply 1 Application topically to the appropriate area as directed 2 (Two) Times a Day As Needed for Rash.              STOP taking these medications      sulfaSALAzine 500 MG tablet  Commonly known as: AZULFIDINE

## 2025-05-27 LAB
CYTOLOGIST CVX/VAG CYTO: NORMAL
PATH INTERP BLD-IMP: NORMAL

## 2025-05-31 LAB
BACTERIA SPEC AEROBE CULT: NORMAL
BACTERIA SPEC AEROBE CULT: NORMAL

## 2025-06-05 ENCOUNTER — TELEPHONE (OUTPATIENT)
Dept: ONCOLOGY | Facility: CLINIC | Age: 30
End: 2025-06-05
Payer: COMMERCIAL

## 2025-06-05 NOTE — TELEPHONE ENCOUNTER
Caller: Donald Bennett    Relationship to patient: Self    Best call back number:[phone]    Chief complaint: PT WAS SEEN IN ED ON 5/25?26 & HER D/C SUMMARY STATES SHE NEEDS TO F/U WITH DR. HERNANDEZ IN THREE DAYS, WAS SEEN A YEAR AGO    Requested date: ASAP

## 2025-06-09 ENCOUNTER — LAB (OUTPATIENT)
Facility: HOSPITAL | Age: 30
End: 2025-06-09
Payer: COMMERCIAL

## 2025-06-09 ENCOUNTER — OFFICE VISIT (OUTPATIENT)
Age: 30
End: 2025-06-09
Payer: COMMERCIAL

## 2025-06-09 ENCOUNTER — TRANSCRIBE ORDERS (OUTPATIENT)
Facility: HOSPITAL | Age: 30
End: 2025-06-09
Payer: COMMERCIAL

## 2025-06-09 VITALS
HEIGHT: 66 IN | SYSTOLIC BLOOD PRESSURE: 113 MMHG | HEART RATE: 74 BPM | WEIGHT: 168 LBS | OXYGEN SATURATION: 97 % | DIASTOLIC BLOOD PRESSURE: 77 MMHG | TEMPERATURE: 97.5 F | BODY MASS INDEX: 27 KG/M2

## 2025-06-09 DIAGNOSIS — M25.50 GENERALIZED JOINT PAIN: ICD-10-CM

## 2025-06-09 DIAGNOSIS — D70.9 NEUTROPENIA, UNSPECIFIED TYPE: ICD-10-CM

## 2025-06-09 DIAGNOSIS — D72.819 CHRONIC LEUKOPENIA: Primary | ICD-10-CM

## 2025-06-09 DIAGNOSIS — D70.9 NEUTROPENIA, UNSPECIFIED TYPE: Primary | ICD-10-CM

## 2025-06-09 LAB
ALBUMIN SERPL-MCNC: 3.9 G/DL (ref 3.5–5.2)
ALP SERPL-CCNC: 75 U/L (ref 39–117)
ALT SERPL W P-5'-P-CCNC: 28 U/L (ref 1–33)
AST SERPL-CCNC: 31 U/L (ref 1–32)
BILIRUB CONJ SERPL-MCNC: 0.1 MG/DL (ref 0–0.3)
BILIRUB INDIRECT SERPL-MCNC: 0.2 MG/DL
BILIRUB SERPL-MCNC: 0.3 MG/DL (ref 0–1.2)
CREAT SERPL-MCNC: 0.92 MG/DL (ref 0.57–1)
CRP SERPL-MCNC: 1.95 MG/DL (ref 0–0.5)
DEPRECATED RDW RBC AUTO: 42.1 FL (ref 37–54)
EGFRCR SERPLBLD CKD-EPI 2021: 85.5 ML/MIN/1.73
ERYTHROCYTE [DISTWIDTH] IN BLOOD BY AUTOMATED COUNT: 12.8 % (ref 12.3–15.4)
ERYTHROCYTE [SEDIMENTATION RATE] IN BLOOD: 35 MM/HR (ref 0–20)
FOLATE SERPL-MCNC: 3.74 NG/ML (ref 4.78–24.2)
HCT VFR BLD AUTO: 35.6 % (ref 34–46.6)
HGB BLD-MCNC: 12 G/DL (ref 12–15.9)
MCH RBC QN AUTO: 30.5 PG (ref 26.6–33)
MCHC RBC AUTO-ENTMCNC: 33.7 G/DL (ref 31.5–35.7)
MCV RBC AUTO: 90.6 FL (ref 79–97)
PLATELET # BLD AUTO: 147 10*3/MM3 (ref 140–450)
PMV BLD AUTO: NORMAL FL
PROT SERPL-MCNC: 7.4 G/DL (ref 6–8.5)
RBC # BLD AUTO: 3.93 10*6/MM3 (ref 3.77–5.28)
WBC NRBC COR # BLD AUTO: 4.32 10*3/MM3 (ref 3.4–10.8)

## 2025-06-09 PROCEDURE — 82746 ASSAY OF FOLIC ACID SERUM: CPT

## 2025-06-09 PROCEDURE — 80076 HEPATIC FUNCTION PANEL: CPT

## 2025-06-09 PROCEDURE — 85025 COMPLETE CBC W/AUTO DIFF WBC: CPT

## 2025-06-09 PROCEDURE — 82565 ASSAY OF CREATININE: CPT

## 2025-06-09 PROCEDURE — 86140 C-REACTIVE PROTEIN: CPT

## 2025-06-09 PROCEDURE — 84165 PROTEIN E-PHORESIS SERUM: CPT

## 2025-06-09 PROCEDURE — 82595 ASSAY OF CRYOGLOBULIN: CPT

## 2025-06-09 PROCEDURE — 84155 ASSAY OF PROTEIN SERUM: CPT

## 2025-06-09 PROCEDURE — 85652 RBC SED RATE AUTOMATED: CPT

## 2025-06-09 PROCEDURE — 99214 OFFICE O/P EST MOD 30 MIN: CPT | Performed by: NURSE PRACTITIONER

## 2025-06-09 PROCEDURE — 85007 BL SMEAR W/DIFF WBC COUNT: CPT

## 2025-06-09 PROCEDURE — 36415 COLL VENOUS BLD VENIPUNCTURE: CPT

## 2025-06-09 NOTE — PROGRESS NOTES
PROBLEM LIST:  Oncology/Hematology History    No history exists.       REASON FOR VISIT: Neutropenia and thrombocytopenia    HISTORY OF PRESENT ILLNESS:   30 y.o.  female presents today for follow-up following ER visit and follow up.  Last seen by Dr. Escalera June 2024.  Plans to follow up in 4 months with repeat labs, but pt was lost to follow up.  Prior to last appt her CBC was checked with weekly labs x 4 in April 2024, some mild decrease in WBC, but did not see a drastic drop as expected with 21 day cycle and cyclic neutropenia was ruled out at that time.  In the differential was also benign ethnic neutropenia, but on Dr. Escalera's initial consultation May 2024, it did not appear to be the case.  Suspected an autoimmune process and noted negative LESTER.  She was recently seen in ER 5/25/25 with chills, right axillary lymphadenopathy and malaise.  Workup was unremarkable except for WBC 1.95 with ANC 0.90.  She is following with  rheumatology who she saw today for suspected psoriatic arthritis.  Prior to ER visit she had been initiated on sulfasalazine 2 weeks prior and was held with recent ER visit.    Reports symptoms have resolved including axillary lymphadenopathy.  Noted irregular cycles with PCOS    Past medical history, social history and family history was reviewed 06/09/25 and unchanged from prior visit.    Review of Systems:    Review of Systems   Constitutional: Negative.    Respiratory: Negative.     Cardiovascular: Negative.    Neurological:  Positive for dizziness and headaches.            Medications:        Current Outpatient Medications:     cetirizine (zyrTEC) 10 MG tablet, Take 1 tablet by mouth Every Night., Disp: 30 tablet, Rfl: 5    clobetasol (TEMOVATE) 0.05 % external solution, Apply  topically to the appropriate area as directed 2 (Two) Times a Day., Disp: 50 mL, Rfl: 1    diclofenac (VOLTAREN) 75 MG EC tablet, Take 1 tablet by mouth 2 (Two) Times a Day As Needed (right hand pain). Take with  "food., Disp: 60 tablet, Rfl: 1    methocarbamol (ROBAXIN) 500 MG tablet, Take 1 tablet by mouth 2 (Two) Times a Day As Needed for Muscle Spasms (muscule pain)., Disp: 60 tablet, Rfl: 2    triamcinolone (KENALOG) 0.1 % cream, Apply 1 Application topically to the appropriate area as directed 2 (Two) Times a Day As Needed for Rash., Disp: 453 g, Rfl: 1    Pain Medications              diclofenac (VOLTAREN) 75 MG EC tablet Take 1 tablet by mouth 2 (Two) Times a Day As Needed (right hand pain). Take with food.    methocarbamol (ROBAXIN) 500 MG tablet Take 1 tablet by mouth 2 (Two) Times a Day As Needed for Muscle Spasms (muscule pain).               ALLERGIES:  No Known Allergies      Physical Exam    VITAL SIGNS:  /77   Pulse 74   Temp 97.5 °F (36.4 °C) (Temporal)   Ht 167.6 cm (65.98\")   Wt 76.2 kg (168 lb)   LMP 04/01/2025 (Approximate)   SpO2 97%   BMI 27.13 kg/m²                 Wt Readings from Last 3 Encounters:   06/09/25 76.2 kg (168 lb)   05/25/25 74.4 kg (164 lb)   04/15/25 74.6 kg (164 lb 6.4 oz)       Body mass index is 27.13 kg/m². Body surface area is 1.86 meters squared.       Performance Status: 0    General: well appearing, in no acute distress  HEENT: sclera anicteric, neck is supple  Skin: no rashes, lesions, bruising, or petechiae  CV:  S1S2 reg no murmur  Resp:  CTAB, unlabored  Msk:  Shows no weakness of the large muscle groups  Lymph:  No cervical or axillary lymphadenopathy  Psych: Mood is stable          RECENT LABS:    Lab Results   Component Value Date    HGB 12.0 05/25/2025    HCT 35.7 05/25/2025    MCV 87.9 05/25/2025     (L) 05/25/2025    WBC 1.95 (C) 05/25/2025    NEUTROABS 0.90 (L) 05/25/2025    LYMPHSABS 0.98 01/20/2025    MONOSABS 0.49 01/20/2025    EOSABS 0.02 05/25/2025    BASOSABS 0.02 01/20/2025       Lab Results   Component Value Date    GLUCOSE 91 05/25/2025    BUN 8 05/25/2025    CREATININE 0.74 05/25/2025     05/25/2025    K 3.6 05/25/2025     " 05/25/2025    CO2 21.7 (L) 05/25/2025    CALCIUM 9.3 05/25/2025    PROTEINTOT 7.2 05/25/2025    ALBUMIN 3.8 05/25/2025    BILITOT 0.3 05/25/2025    ALKPHOS 71 05/25/2025    AST 25 05/25/2025    ALT 20 05/25/2025     Lab Results   Component Value Date    NEUTROABS 0.90 (L) 05/25/2025    NEUTROABS 3.24 01/20/2025    NEUTROABS 1.14 (L) 10/11/2024    NEUTROABS 1.59 (L) 05/31/2024    NEUTROABS 1.57 (L) 05/24/2024         MRI Brain Without Contrast    Result Date: 5/28/2024  Impression: Negative. Electronically Signed: Yamilet Steel MD  5/28/2024 11:53 AM EDT  Workstation ID: [id number]    US Abdomen Limited    Result Date: 3/8/2024  Impression: No sonographic abnormality in the right upper quadrant or spleen Electronically Signed: Edgardo Lopez MD  3/8/2024 5:33 PM EST  Workstation ID: [id number]    US Spleen    Result Date: 3/8/2024  Impression: No sonographic abnormality in the right upper quadrant or spleen Electronically Signed: Edgardo Lopez MD  3/8/2024 5:33 PM EST  Workstation ID: [id number]          Assessment/Plan    1.  Neutropenia and thrombocytopenia.  Reviewed labs and recent ER visit with Dr. Escalera. She was checked for cyclical neutropenia in May 2024 with CBC weekly x 4 weeks with mild decrease in counts over 21 day cycle and was ruled out.   She has been recently started on sulfasalazine 2 weeks prior to ER visit and been held since that time.  Dr. Escalera does not think a malignant process is present.  Appears autoimmune process or possibly new medication may have caused the drop in leukocytes.  Noted LESTER negative.  Plans to repeat labs today with rheumatology including CBC which we will review.  She has been off her sulfasalazine since 5/26.  No current plans for further workup.  Will plan to follow up in 3 months with repeat labs or sooner as needed.      I spent 36 minutes caring for Donald.  This includes time spent by me in the following activities:  preparing for visit, reviewing tests, obtaining  history, performing physical exam, education, ordering necessary medications/testing and documenting in the medical record.     JEANNE Chinchilla  [facility] Hematology and Oncology    Return in (Approximately): 3 months    No orders of the defined types were placed in this encounter.      6/9/2025     Future Appointments         Provider Department Center    7/16/2025 9:00 AM (Arrive by 8:45 AM) Mk Bob MD [facility] RHEUMATOLOGY [facility]    9/15/2025 11:45 AM (Arrive by 11:30 AM) Yosvany Caruso MD [facility] HEMATOLOGY  & ONCOLOGY [facility]

## 2025-06-10 LAB
BASOPHILS # BLD MANUAL: 0.04 10*3/MM3 (ref 0–0.2)
BASOPHILS NFR BLD MANUAL: 1 % (ref 0–1.5)
EOSINOPHIL # BLD MANUAL: 0.18 10*3/MM3 (ref 0–0.4)
EOSINOPHIL NFR BLD MANUAL: 4.1 % (ref 0.3–6.2)
LYMPHOCYTES # BLD MANUAL: 1.02 10*3/MM3 (ref 0.7–3.1)
LYMPHOCYTES NFR BLD MANUAL: 12.2 % (ref 5–12)
MONOCYTES # BLD: 0.53 10*3/MM3 (ref 0.1–0.9)
NEUTROPHILS # BLD AUTO: 2.56 10*3/MM3 (ref 1.7–7)
NEUTROPHILS NFR BLD MANUAL: 59.2 % (ref 42.7–76)
PLAT MORPH BLD: NORMAL
RBC MORPH BLD: NORMAL
VARIANT LYMPHS NFR BLD MANUAL: 23.5 % (ref 19.6–45.3)
WBC MORPH BLD: NORMAL

## 2025-06-11 LAB
ALBUMIN SERPL ELPH-MCNC: 3.4 G/DL (ref 2.9–4.4)
ALBUMIN/GLOB SERPL: 1 {RATIO} (ref 0.7–1.7)
ALPHA1 GLOB SERPL ELPH-MCNC: 0.2 G/DL (ref 0–0.4)
ALPHA2 GLOB SERPL ELPH-MCNC: 0.9 G/DL (ref 0.4–1)
B-GLOBULIN SERPL ELPH-MCNC: 1 G/DL (ref 0.7–1.3)
GAMMA GLOB SERPL ELPH-MCNC: 1.3 G/DL (ref 0.4–1.8)
GLOBULIN SER CALC-MCNC: 3.4 G/DL (ref 2.2–3.9)
LABORATORY COMMENT REPORT: NORMAL
M PROTEIN SERPL ELPH-MCNC: NORMAL G/DL
PROT SERPL-MCNC: 6.8 G/DL (ref 6–8.5)

## 2025-06-16 LAB — CRYOGLOB SER QL 1D COLD INC: NORMAL
